# Patient Record
Sex: FEMALE | Race: OTHER | Employment: FULL TIME | URBAN - METROPOLITAN AREA
[De-identification: names, ages, dates, MRNs, and addresses within clinical notes are randomized per-mention and may not be internally consistent; named-entity substitution may affect disease eponyms.]

---

## 2017-04-25 ENCOUNTER — OFFICE VISIT (OUTPATIENT)
Dept: OBGYN CLINIC | Age: 42
End: 2017-04-25
Payer: COMMERCIAL

## 2017-04-25 VITALS
RESPIRATION RATE: 18 BRPM | WEIGHT: 139 LBS | HEART RATE: 72 BPM | DIASTOLIC BLOOD PRESSURE: 60 MMHG | HEIGHT: 63 IN | SYSTOLIC BLOOD PRESSURE: 114 MMHG | BODY MASS INDEX: 24.63 KG/M2

## 2017-04-25 DIAGNOSIS — N89.8 VAGINAL DISCHARGE: Primary | ICD-10-CM

## 2017-04-25 DIAGNOSIS — R10.2 VAGINAL PAIN: ICD-10-CM

## 2017-04-25 DIAGNOSIS — D25.0 SUBMUCOUS LEIOMYOMA OF UTERUS: ICD-10-CM

## 2017-04-25 PROCEDURE — 99213 OFFICE O/P EST LOW 20 MIN: CPT | Performed by: ADVANCED PRACTICE MIDWIFE

## 2017-04-25 ASSESSMENT — PATIENT HEALTH QUESTIONNAIRE - PHQ9
SUM OF ALL RESPONSES TO PHQ QUESTIONS 1-9: 0
SUM OF ALL RESPONSES TO PHQ9 QUESTIONS 1 & 2: 0
1. LITTLE INTEREST OR PLEASURE IN DOING THINGS: 0
2. FEELING DOWN, DEPRESSED OR HOPELESS: 0

## 2017-04-27 DIAGNOSIS — N89.8 VAGINAL DISCHARGE: ICD-10-CM

## 2017-04-27 DIAGNOSIS — R10.2 VAGINAL PAIN: ICD-10-CM

## 2017-04-27 RX ORDER — FLUCONAZOLE 150 MG/1
150 TABLET ORAL ONCE
Qty: 1 TABLET | Refills: 1 | Status: SHIPPED | OUTPATIENT
Start: 2017-04-27 | End: 2017-04-27

## 2017-04-27 RX ORDER — METRONIDAZOLE 500 MG/1
500 TABLET ORAL 2 TIMES DAILY
Qty: 14 TABLET | Refills: 0 | Status: SHIPPED | OUTPATIENT
Start: 2017-04-27 | End: 2017-05-04

## 2017-10-19 ENCOUNTER — TELEPHONE (OUTPATIENT)
Dept: OBGYN CLINIC | Age: 42
End: 2017-10-19

## 2017-10-19 ENCOUNTER — HOSPITAL ENCOUNTER (OUTPATIENT)
Dept: ULTRASOUND IMAGING | Age: 42
Discharge: HOME | End: 2017-10-19
Payer: COMMERCIAL

## 2017-10-19 ENCOUNTER — HOSPITAL ENCOUNTER (OUTPATIENT)
Age: 42
Setting detail: SPECIMEN
Discharge: HOME | End: 2017-10-19
Payer: COMMERCIAL

## 2017-10-19 DIAGNOSIS — D25.0 SUBMUCOUS LEIOMYOMA OF UTERUS: ICD-10-CM

## 2017-10-19 DIAGNOSIS — N92.6 MISSED MENSES: ICD-10-CM

## 2017-10-19 DIAGNOSIS — R10.2 VAGINAL PAIN: ICD-10-CM

## 2017-10-19 DIAGNOSIS — N89.8 VAGINAL DISCHARGE: ICD-10-CM

## 2017-10-19 DIAGNOSIS — O36.80X0 ENCOUNTER TO DETERMINE FETAL VIABILITY OF PREGNANCY, SINGLE OR UNSPECIFIED FETUS: Primary | ICD-10-CM

## 2017-10-19 PROBLEM — D21.9 FIBROID: Status: ACTIVE | Noted: 2017-10-19

## 2017-10-19 LAB — HCG QUANTITATIVE: ABNORMAL IU/L

## 2017-10-19 PROCEDURE — 36415 COLL VENOUS BLD VENIPUNCTURE: CPT

## 2017-10-19 PROCEDURE — 76830 TRANSVAGINAL US NON-OB: CPT

## 2017-10-19 PROCEDURE — 76856 US EXAM PELVIC COMPLETE: CPT

## 2017-10-19 PROCEDURE — 84702 CHORIONIC GONADOTROPIN TEST: CPT

## 2017-10-23 ENCOUNTER — TELEPHONE (OUTPATIENT)
Dept: OBGYN CLINIC | Age: 42
End: 2017-10-23

## 2017-10-23 DIAGNOSIS — Z34.90 EARLY STAGE OF PREGNANCY: Primary | ICD-10-CM

## 2017-10-23 NOTE — TELEPHONE ENCOUNTER
----- Message from Nicholas Contreras CNP sent at 10/23/2017  7:59 AM EDT -----  OB Ultrasound to determine fetal viability  Prenatal vitamin 1 PO QD with 12 refills  Will need new OB appointment once fetal viability is established.

## 2017-10-23 NOTE — TELEPHONE ENCOUNTER
----- Message from Rae Jeter CNP sent at 10/23/2017  7:59 AM EDT -----  OB Ultrasound to determine fetal viability  Prenatal vitamin 1 PO QD with 12 refills  Will need new OB appointment once fetal viability is established.

## 2017-10-23 NOTE — TELEPHONE ENCOUNTER
Per Mariusz, pt given positive quant results. Ob ultrasound ordered and prenatal vitamins sent to pts pharmacy.

## 2017-11-02 ENCOUNTER — TELEPHONE (OUTPATIENT)
Dept: OBGYN CLINIC | Age: 42
End: 2017-11-02

## 2017-11-02 ENCOUNTER — APPOINTMENT (OUTPATIENT)
Dept: ULTRASOUND IMAGING | Age: 42
End: 2017-11-02
Payer: COMMERCIAL

## 2017-11-02 ENCOUNTER — HOSPITAL ENCOUNTER (EMERGENCY)
Age: 42
Discharge: HOME | End: 2017-11-02
Attending: EMERGENCY MEDICINE
Payer: COMMERCIAL

## 2017-11-02 VITALS
HEIGHT: 63 IN | HEART RATE: 74 BPM | WEIGHT: 120 LBS | SYSTOLIC BLOOD PRESSURE: 123 MMHG | TEMPERATURE: 97.7 F | OXYGEN SATURATION: 97 % | RESPIRATION RATE: 16 BRPM | DIASTOLIC BLOOD PRESSURE: 72 MMHG | BODY MASS INDEX: 21.26 KG/M2

## 2017-11-02 DIAGNOSIS — O03.4 INEVITABLE ABORTION: Primary | ICD-10-CM

## 2017-11-02 LAB
ABO/RH: NORMAL
ABSOLUTE EOS #: 0.09 K/UL (ref 0–0.4)
ABSOLUTE IMMATURE GRANULOCYTE: ABNORMAL K/UL (ref 0–0.3)
ABSOLUTE LYMPH #: 2.37 K/UL (ref 1–4.8)
ABSOLUTE MONO #: 0.64 K/UL (ref 0.1–1.3)
BASOPHILS # BLD: 1 %
BASOPHILS ABSOLUTE: 0.09 K/UL (ref 0–0.2)
BLOOD BANK COMMENT: NORMAL
DIFFERENTIAL TYPE: ABNORMAL
EOSINOPHILS RELATIVE PERCENT: 1 %
HCG QUANTITATIVE: ABNORMAL IU/L
HCT VFR BLD CALC: 38.1 % (ref 36–46)
HEMOGLOBIN: 12.5 G/DL (ref 12–16)
IMMATURE GRANULOCYTES: ABNORMAL %
LYMPHOCYTES # BLD: 26 %
MCH RBC QN AUTO: 23.5 PG (ref 26–34)
MCHC RBC AUTO-ENTMCNC: 32.7 G/DL (ref 31–37)
MCV RBC AUTO: 71.8 FL (ref 80–100)
MONOCYTES # BLD: 7 %
MORPHOLOGY: ABNORMAL
PDW BLD-RTO: 14.8 % (ref 11.5–14.9)
PLATELET # BLD: 409 K/UL (ref 150–450)
PLATELET ESTIMATE: ABNORMAL
PMV BLD AUTO: 7.7 FL (ref 6–12)
RBC # BLD: 5.3 M/UL (ref 4–5.2)
RBC # BLD: ABNORMAL 10*6/UL
SEG NEUTROPHILS: 65 %
SEGMENTED NEUTROPHILS ABSOLUTE COUNT: 5.91 K/UL (ref 1.3–9.1)
WBC # BLD: 9.1 K/UL (ref 3.5–11)
WBC # BLD: ABNORMAL 10*3/UL

## 2017-11-02 PROCEDURE — 99284 EMERGENCY DEPT VISIT MOD MDM: CPT

## 2017-11-02 PROCEDURE — 86901 BLOOD TYPING SEROLOGIC RH(D): CPT

## 2017-11-02 PROCEDURE — 86900 BLOOD TYPING SEROLOGIC ABO: CPT

## 2017-11-02 PROCEDURE — 76817 TRANSVAGINAL US OBSTETRIC: CPT

## 2017-11-02 PROCEDURE — 36415 COLL VENOUS BLD VENIPUNCTURE: CPT

## 2017-11-02 PROCEDURE — 84702 CHORIONIC GONADOTROPIN TEST: CPT

## 2017-11-02 PROCEDURE — 85025 COMPLETE CBC W/AUTO DIFF WBC: CPT

## 2017-11-02 RX ORDER — COLCHICINE 0.6 MG/1
1.2 TABLET ORAL ONCE
Status: DISCONTINUED | OUTPATIENT
Start: 2017-11-02 | End: 2017-11-02

## 2017-11-02 ASSESSMENT — ENCOUNTER SYMPTOMS
SHORTNESS OF BREATH: 0
EYE PAIN: 0
ABDOMINAL PAIN: 0
RHINORRHEA: 0
COUGH: 0
NAUSEA: 0
EYE REDNESS: 0
VOMITING: 0
BACK PAIN: 0

## 2017-11-02 NOTE — ED PROVIDER NOTES
patient's allergies indicates no known allergies. FAMILY HISTORY       Family Status   Relation Status    Father     Mother       Family History   Problem Relation Age of Onset    Heart Attack Father     Diabetes Mother     Hypertension Mother        SOCIAL HISTORY       Social History     Social History    Marital status:      Spouse name: N/A    Number of children: N/A    Years of education: N/A     Social History Main Topics    Smoking status: Never Smoker    Smokeless tobacco: Never Used    Alcohol use 0.6 oz/week     1 Standard drinks or equivalent per week    Drug use: No    Sexual activity: Yes     Partners: Male     Other Topics Concern    None     Social History Narrative    None       PHYSICAL EXAM     INITIAL VITALS:  height is 5' 3\" (1.6 m) and weight is 120 lb (54.4 kg). Her oral temperature is 97.7 °F (36.5 °C). Her blood pressure is 123/72 and her pulse is 74. Her respiration is 16 and oxygen saturation is 97%. Physical Exam   Constitutional: She is oriented to person, place, and time. She appears well-developed and well-nourished. HENT:   Head: Normocephalic and atraumatic. Nose: Nose normal.   Mouth/Throat: Oropharynx is clear and moist.   Eyes: Conjunctivae and EOM are normal. Pupils are equal, round, and reactive to light. Neck: Normal range of motion. Neck supple. Cardiovascular: Normal rate, regular rhythm, normal heart sounds and intact distal pulses. Pulmonary/Chest: Effort normal and breath sounds normal.   Abdominal: Soft. Bowel sounds are normal.   Musculoskeletal: Normal range of motion. Neurological: She is alert and oriented to person, place, and time. Skin: Skin is warm and dry. Nursing note and vitals reviewed. DIFFERENTIAL DIAGNOSIS/ MDM:     Patient here with threatened miscarriage. Labs and ultrasound pending    1320: Patient with diminishing Quant and ultrasound does not show anything reassuring.   Patient updated at this time is stable for discharge    DIAGNOSTIC RESULTS       RADIOLOGY:   I directly visualized the following  images and reviewed the radiologist interpretations:  US OB TRANSVAGINAL    (Results Pending)          LABS:  Labs Reviewed   HCG, QUANTITATIVE, PREGNANCY - Abnormal; Notable for the following:        Result Value    hCG Quant 12,687 (*)     All other components within normal limits   CBC WITH AUTO DIFFERENTIAL - Abnormal; Notable for the following:     RBC 5.30 (*)     MCV 71.8 (*)     MCH 23.5 (*)     All other components within normal limits   ABO/RH           EMERGENCY DEPARTMENT COURSE:   Vitals:    Vitals:    17 1125   BP: 123/72   Pulse: 74   Resp: 16   Temp: 97.7 °F (36.5 °C)   TempSrc: Oral   SpO2: 97%   Weight: 120 lb (54.4 kg)   Height: 5' 3\" (1.6 m)     -------------------------  BP: 123/72, Temp: 97.7 °F (36.5 °C), Pulse: 74, Resp: 16      FINAL IMPRESSION      1.  Inevitable           DISPOSITION/PLAN    DISPOSITION Decision to Discharge    PATIENT REFERRED TO:  Olesya Swan, [address] [address] [facility] [address] [address]    Call in 1 day        DISCHARGE MEDICATIONS:  New Prescriptions    No medications on file       (Please note that portions of this note were completed with a voice recognition program.  Efforts were made to edit the dictations but occasionally words are mis-transcribed.)    Dean Hollingsworth MD, [facility]  Attending Emergency Physician                     Dean Hollingsworth MD  [phone]

## 2017-11-02 NOTE — FLOWSHEET NOTE
Patient shared she is two months pregnant and concerned about the baby. She said her 15 yo daughter is very excited about being a big sister. 11/02/17 1304   Encounter Summary   Services provided to: Patient   Referral/Consult From: [facility]   Support System Spouse; Children   Continue Visiting (11-2-17)   Complexity of Encounter Low   Length of Encounter 15 minutes   Spiritual Assessment Completed Yes   Routine   Type Initial   Assessment Approachable; Anxious   Intervention Active listening;Explored feelings, thoughts, concerns;Nurtured hope;Prayer;Sustaining presence/ Ministry of presence   Outcome Expressed gratitude;Engaged in conversation;Expressed feelings/needs/concerns

## 2017-11-02 NOTE — ED NOTES
Pt said that she's had some vaginal bleeding since last night. That she noticed it when she went to the bathroom and urinated. It was pink. This morning around 8 am she had some thicker blood when she urinated. Pt said that she had cramping then, but it is now gone.       Shakir Gauthier RN  11/02/17 9810

## 2021-06-15 ENCOUNTER — HOSPITAL ENCOUNTER (OUTPATIENT)
Age: 46
Setting detail: SPECIMEN
Discharge: HOME | End: 2021-06-15
Payer: COMMERCIAL

## 2021-06-15 ENCOUNTER — OFFICE VISIT (OUTPATIENT)
Dept: OBGYN CLINIC | Age: 46
End: 2021-06-15
Payer: COMMERCIAL

## 2021-06-15 VITALS
WEIGHT: 149 LBS | BODY MASS INDEX: 26.4 KG/M2 | HEIGHT: 63 IN | SYSTOLIC BLOOD PRESSURE: 114 MMHG | DIASTOLIC BLOOD PRESSURE: 70 MMHG

## 2021-06-15 DIAGNOSIS — Z01.419 WELL FEMALE EXAM WITH ROUTINE GYNECOLOGICAL EXAM: Primary | ICD-10-CM

## 2021-06-15 DIAGNOSIS — R30.0 DYSURIA: ICD-10-CM

## 2021-06-15 DIAGNOSIS — R10.9 ABDOMINAL CRAMPING: ICD-10-CM

## 2021-06-15 DIAGNOSIS — R10.2 PELVIC PAIN: ICD-10-CM

## 2021-06-15 DIAGNOSIS — Z12.31 ENCOUNTER FOR SCREENING MAMMOGRAM FOR MALIGNANT NEOPLASM OF BREAST: ICD-10-CM

## 2021-06-15 DIAGNOSIS — Z11.51 SPECIAL SCREENING EXAMINATION FOR HUMAN PAPILLOMAVIRUS (HPV): ICD-10-CM

## 2021-06-15 LAB
-: ABNORMAL
AMORPHOUS: ABNORMAL
BACTERIA: ABNORMAL
BILIRUBIN URINE: NEGATIVE
CASTS UA: ABNORMAL /LPF
COLOR: YELLOW
COMMENT UA: ABNORMAL
CRYSTALS, UA: ABNORMAL /HPF
DIRECT EXAM: ABNORMAL
EPITHELIAL CELLS UA: ABNORMAL /HPF
GLUCOSE URINE: NEGATIVE
KETONES, URINE: NEGATIVE
LEUKOCYTE ESTERASE, URINE: ABNORMAL
Lab: ABNORMAL
MUCUS: ABNORMAL
NITRITE, URINE: POSITIVE
OTHER OBSERVATIONS UA: ABNORMAL
PH UA: 6 (ref 5–8)
PROTEIN UA: ABNORMAL
RBC UA: ABNORMAL /HPF
RENAL EPITHELIAL, UA: ABNORMAL /HPF
SPECIFIC GRAVITY UA: 1.02 (ref 1–1.03)
SPECIMEN DESCRIPTION: ABNORMAL
TRICHOMONAS: ABNORMAL
TURBIDITY: ABNORMAL
URINE HGB: ABNORMAL
UROBILINOGEN, URINE: NORMAL
WBC UA: ABNORMAL /HPF
YEAST: ABNORMAL

## 2021-06-15 PROCEDURE — 87491 CHLMYD TRACH DNA AMP PROBE: CPT

## 2021-06-15 PROCEDURE — 87660 TRICHOMONAS VAGIN DIR PROBE: CPT

## 2021-06-15 PROCEDURE — 87510 GARDNER VAG DNA DIR PROBE: CPT

## 2021-06-15 PROCEDURE — 99386 PREV VISIT NEW AGE 40-64: CPT | Performed by: NURSE PRACTITIONER

## 2021-06-15 PROCEDURE — 81001 URINALYSIS AUTO W/SCOPE: CPT

## 2021-06-15 PROCEDURE — 87591 N.GONORRHOEAE DNA AMP PROB: CPT

## 2021-06-15 PROCEDURE — 87086 URINE CULTURE/COLONY COUNT: CPT

## 2021-06-15 PROCEDURE — G0145 SCR C/V CYTO,THINLAYER,RESCR: HCPCS

## 2021-06-15 PROCEDURE — 87480 CANDIDA DNA DIR PROBE: CPT

## 2021-06-15 PROCEDURE — 87088 URINE BACTERIA CULTURE: CPT

## 2021-06-15 PROCEDURE — 87186 SC STD MICRODIL/AGAR DIL: CPT

## 2021-06-15 PROCEDURE — 87624 HPV HI-RISK TYP POOLED RSLT: CPT

## 2021-06-15 RX ORDER — PHENAZOPYRIDINE HYDROCHLORIDE 200 MG/1
200 TABLET, FILM COATED ORAL 3 TIMES DAILY PRN
Qty: 9 TABLET | Refills: 0 | Status: SHIPPED | OUTPATIENT
Start: 2021-06-15 | End: 2021-06-18

## 2021-06-15 RX ORDER — CEPHALEXIN 500 MG/1
500 CAPSULE ORAL 3 TIMES DAILY
Qty: 21 CAPSULE | Refills: 0 | Status: SHIPPED | OUTPATIENT
Start: 2021-06-15 | End: 2021-06-22

## 2021-06-15 ASSESSMENT — PATIENT HEALTH QUESTIONNAIRE - PHQ9
SUM OF ALL RESPONSES TO PHQ QUESTIONS 1-9: 0
2. FEELING DOWN, DEPRESSED OR HOPELESS: 0
SUM OF ALL RESPONSES TO PHQ QUESTIONS 1-9: 0
SUM OF ALL RESPONSES TO PHQ9 QUESTIONS 1 & 2: 0
SUM OF ALL RESPONSES TO PHQ QUESTIONS 1-9: 0
1. LITTLE INTEREST OR PLEASURE IN DOING THINGS: 0

## 2021-06-15 NOTE — PROGRESS NOTES
History and Physical  [address], [address] [facility] [address] [phone]   Fax [phone]  Galilea Rivas  6/15/2021              39 y.o. Chief Complaint   Patient presents with    Annual Exam       Patient's last menstrual period was 2021. Primary Care Physician: VENANCIO Scott - CNP    The patient was seen and examined. She has no chief complaint today and is here for her annual exam.  Her bowels are regular. There are no voiding complaints. She denies any bloating. She denies vaginal discharge and was counseled on STD's and the need for barrier contraception. HPI : Galilea Rivas is a 39 y.o. female     Annual exam  Complaining of burning after urination. Has been occurring for the past week. Complaining of lower pelvic pain. Started 2 days ago. Was bad last night and had to take 2 tylenol. Denies fever. Denies new partner. Has  of 3 years. Still having monthly menses. LMP 2 weeks ago  ________________________________________________________________________  OB History    Para Term  AB Living   1 1 1 0 0 1   SAB TAB Ectopic Molar Multiple Live Births   0 0 0 0 0 1      # Outcome Date GA Lbr Gerard/2nd Weight Sex Delivery Anes PTL Lv   1 Term 03 38w0d 04:00 6 lb (2.722 kg) F CS-LTranv  N NOEMY      Birth Comments: Breech     History reviewed. No pertinent past medical history.                                                                 Past Surgical History:   Procedure Laterality Date     SECTION       Family History   Problem Relation Age of Onset    Heart Attack Father     Diabetes Mother     Hypertension Mother      Social History     Socioeconomic History    Marital status:      Spouse name: Not on file    Number of children: Not on file    Years of education: Not on file    Highest education level: Not on file   Occupational History    Not on file   Tobacco Use available. Gynecologic History:  Menarche: 12 yo  Menopause at [address] [place] yo     Patient's last menstrual period was 06/01/2021. Sexually Active: Yes    STD History: No     Permanent Sterilization: No   Reversible Birth Control: No        Hormone Replacement Exposure: No      Genetic Qualified Family History of Breast, Ovarian , Colon or Uterine Cancer: No     If YES see scanned worksheet. Preventative Health Testing:    Health Maintenance:  Health Maintenance Due   Topic Date Due    Hepatitis C screen  Never done    COVID-19 Vaccine (1) Never done    HIV screen  Never done    DTaP/Tdap/Td vaccine (1 - Tdap) Never done    Lipid screen  Never done    Diabetes screen  Never done    Cervical cancer screen  10/10/2017       Date of Last Pap Smear: 10/10/2016 neg/neg  Abnormal Pap Smear History: denies  Colposcopy History:   Date of Last Mammogram:  Never had one  Date of Last Colonoscopy:   Date of Last Bone Density:      ________________________________________________________________________        REVIEW OF SYSTEMS:    yes   A minimum of an eleven point review of systems was completed. Review Of Systems (11 point):  Constitutional: No fever, chills or malaise; No weight change or fatigue  Head and Eyes: No vision, Headache, Dizziness or trauma in last 12 months  ENT ROS: No hearing, Tinnitis, sinus or taste problems  Hematological and Lymphatic ROS:No Lymphoma, Von Willebrand's, Hemophillia or Bleeding History  Psych ROS: No Depression, Homicidal thoughts,suicidal thoughts, or anxiety  Breast ROS: No prior breast abnormalities or lumps  Respiratory ROS: No SOB, Pneumoniae,Cough, or Pulmonary Embolism History  Cardiovascular ROS: No Chest Pain with Exertion, Palpitations, Syncope, Edema, Arrhythmia  Gastrointestinal ROS: No Indigestion, Heartburn, Nausea, vomiting, Diarrhea, Constipation,or Bowel Changes; No Bloody Stools or melena  Genito-Urinary ROS: No Dysuria, Hematuria or Nocturia.  No Urinary Incontinence or Vaginal Discharge. + pelvic pain, burning after urination  Musculoskeletal ROS: No Arthralgia, Arthritis,Gout,Osteoporosis or Rheumatism  Neurological ROS: No CVA, Migraines, Epilepsy, Seizure Hx, or Limb Weakness  Dermatological ROS: No Rash, Itching, Hives, Mole Changes or Cancer                                                                                                                                                                                                                                  PHYSICAL Exam:     Constitutional:  Vitals:    06/15/21 1444   BP: 114/70   Site: Right Upper Arm   Position: Sitting   Cuff Size: Medium Adult   Weight: 149 lb (67.6 kg)   Height: 5' 3\" (1.6 m)       Chaperone for Intimate Exam   Chaperone was offered and accepted as part of the rooming process.  Chaperone: Ias          General Appearance: This  is a well Developed, well Nourished, well groomed female. Her BMI was reviewed. Nutritional decision making was discussed. Skin:  There was a Normal Inspection of the skin without rashes or lesions. There were no rashes. (Papular, Maculopapular, Hives, Pustular, Macular)     There were no lesions (Ulcers, Erythema, Abn. Appearing Nevi)            Lymphatic:  No Lymph Nodes were Palpable in the neck , axilla or groin.  0 # Of Lymph Nodes; Location ; Character [Normal]  [Shotty] [Tender] [Enlarged]     Neck and EENT:  The neck was supple. There were no masses   The thyroid was not enlarged and had no masses. Perrla, EOMI B/L, TMI B/L No Abnormalities. Throat inspected-No exudates or Masses, Nares Patent No Masses        Respiratory: The lungs were auscultated and found to be clear. There were no rales, rhonchi or wheezes. There was a good respiratory effort. Cardiovascular: The heart was in a regular rate and rhythm. . No S3 or S4. There was no murmur appreciated. Location, grade, and radiation are not applicable. Extremities:   The patients extremities were without calf tenderness, edema, or varicosities. There was full range of motion in all four extremities. Pulses in all four extremities were appreciated and are 2/4. Abdomen: The abdomen was soft and non-tender. There were good bowel sounds in all quadrants and there was no guarding, rebound or rigidity. On evaluation there was no evidence of hepatosplenomegaly and there was no costal vertebral sherley tenderness bilaterally. No hernias were appreciated. Abdominal Scars: intact    Psych: The patient had a normal Orientation to: Time, Place, Person, and Situation  There is no Mood / Affect changes    Breast:  (Chest)  normal appearance, no masses or tenderness  Self breast exams were reviewed in detail. Literature was given. Pelvic Exam:  Vulva and vagina appear normal. Bimanual exam reveals normal uterus and adnexa. Rectal Exam:  exam declined by patient          Musculosk:  Normal Gait and station was noted. Digits were evaluated without abnormal findings. Range of motion, stability and strength were evaluated and found to be appropriate for the patients age. ASSESSMENT:      39 y.o. Annual   Diagnosis Orders   1. Well female exam with routine gynecological exam  PAP SMEAR   2. Encounter for screening mammogram for malignant neoplasm of breast  GALLO DIGITAL SCREEN W OR WO CAD BILATERAL   3. Dysuria  Urinalysis Reflex to Culture    phenazopyridine (PYRIDIUM) 200 MG tablet    cephALEXin (KEFLEX) 500 MG capsule   4. Abdominal cramping  VAGINITIS DNA PROBE    C.trachomatis N.gonorrhoeae DNA   5. Special screening examination for human papillomavirus (HPV)  PAP SMEAR   6. Pelvic pain  US NON OB TRANSVAGINAL    US PELVIS COMPLETE          Chief Complaint   Patient presents with    Annual Exam          History reviewed. No pertinent past medical history.       Patient Active Problem List    Diagnosis Date Noted    Fibroid 10/19/2017          Hereditary Breast, Ovarian, Colon and Uterine Cancer screening Done. Tobacco & Secondary smoke risks reviewed; instructed on cessation and avoidance      Counseling Completed:  Preventative Health Recommendations and Follow up. The patient was informed of the recommended preventative health recommendations. 1. Annuals every year; Cytology collections per prevailing guidelines. 2. Mammograms begin every year at 35 yo if no abnormalities are found and no family history. 3. Bone density studies every 2-3 years. Begin at 73 yo. If no fracture history or osteoporosis family history. (significant). 4. Colonoscopy begin at 38 yo. Repeat every ten years if negative and no family history. 5. Calcium of 5203-5252 mg/day in split dosing  6. Vitamin D 400-800 IU/day  7. All other preventative health recommendations will be managed by the patients Primary care physician. PLAN:  Return in about 1 year (around 6/15/2022) for annual.   Pap smear and Vaginal cultures collected. UA C*S obtained. Mammogram ordered. Pelvic ultrasound ordered- if pelvic pain continues. Prescription for Keflex and pyridium sent pending C*S results- per patient request.  Repeat Annual every 1 year  Cervical Cytology Evaluation begins at 24years old. If Negative Cytology, Follow-up screening per current guidelines. Mammograms every 1 year. If 35 yo and last mammogram was negative. Calcium and Vitamin D dosing reviewed. Colonoscopy screening reviewed as well as onset for bone density testing. Birth control and barrier recommendations discussed. STD counseling and prevention reviewed. Gardisil counseling completed for all patients 10-35 yo. Routine health maintenance per patients PCP.   Orders Placed This Encounter   Procedures    VAGINITIS DNA PROBE     Standing Status:   Future     Standing Expiration Date:   6/15/2022    C.trachomatis N.gonorrhoeae DNA     Standing Status:   Future     Standing Expiration Date:   6/15/2022   Ned HOLDER DIGITAL SCREEN W OR WO CAD BILATERAL     Standing Status:   Future     Standing Expiration Date:   8/15/2022     Order Specific Question:   Reason for exam:     Answer:   screening mammogram    US NON OB TRANSVAGINAL     Standing Status:   Future     Standing Expiration Date:   12/15/2021     Order Specific Question:   Reason for exam:     Answer:   pelvic pain    US PELVIS COMPLETE     Standing Status:   Future     Standing Expiration Date:   9/15/2021     Order Specific Question:   Reason for exam:     Answer:   pelvic pain    PAP SMEAR     Patient History:    Patient's last menstrual period was 2021. OBGYN Status: Having periods  Past Surgical History:  :  SECTION      Social History    Tobacco Use      Smoking status: Never Smoker      Smokeless tobacco: Never Used       Standing Status:   Future     Standing Expiration Date:   6/15/2022     Order Specific Question:   Collection Type     Answer: Thin Prep     Order Specific Question:   Prior Abnormal Pap Test     Answer:   No     Order Specific Question:   Screening or Diagnostic     Answer:   Screening     Order Specific Question:   HPV Requested? Answer:   Yes     Order Specific Question:   High Risk Patient     Answer:   N/A    Urinalysis Reflex to Culture     Standing Status:   Future     Standing Expiration Date:   6/15/2022     Order Specific Question:   SPECIFY(EX-CATH,MIDSTREAM,CYSTO,ETC)? Answer:   clean catch           The patient, Yessy Hampton is a 39 y.o. female, was seen with a total time spent of 20 minutes for the visit on this date of service by the E/M provider. The time component had both face to face and non face to face time spent in determining the total time component. Counseling and education regarding her diagnosis listed below and her options regarding those diagnoses were also included in determining her time component. Diagnosis Orders   1.  Well female exam with routine gynecological exam  PAP SMEAR   2. Encounter for screening mammogram for malignant neoplasm of breast  GALLO DIGITAL SCREEN W OR WO CAD BILATERAL   3. Dysuria  Urinalysis Reflex to Culture    phenazopyridine (PYRIDIUM) 200 MG tablet    cephALEXin (KEFLEX) 500 MG capsule   4. Abdominal cramping  VAGINITIS DNA PROBE    C.trachomatis N.gonorrhoeae DNA   5. Special screening examination for human papillomavirus (HPV)  PAP SMEAR   6. Pelvic pain  US NON OB TRANSVAGINAL    US PELVIS COMPLETE        The patient had her preventative health maintenance recommendations and follow-up reviewed with her at the completion of her visit.

## 2021-06-16 ENCOUNTER — TELEPHONE (OUTPATIENT)
Dept: OBGYN CLINIC | Age: 46
End: 2021-06-16

## 2021-06-16 NOTE — TELEPHONE ENCOUNTER
Tried calling patient regarding UA and +BV. Pt will need flagyl sent to pt pharm. Ammon Jones, APRN - CNP  [facility] Ob[facility] Clinical Support Pool  +UTI- C&S results pending   Patient was sent script for keflex and pyridium yesterday   Please make sure patient knows results and to fill prescriptions-may have to change antibiotic once C&S results are back.      +BV- flagyl 500mg PO BID x 7 days

## 2021-06-17 LAB
C TRACH DNA GENITAL QL NAA+PROBE: NEGATIVE
CULTURE: ABNORMAL
HPV SAMPLE: NORMAL
HPV, GENOTYPE 16: NOT DETECTED
HPV, GENOTYPE 18: NOT DETECTED
HPV, HIGH RISK OTHER: NOT DETECTED
HPV, INTERPRETATION: NORMAL
Lab: ABNORMAL
N. GONORRHOEAE DNA: NEGATIVE
SPECIMEN DESCRIPTION: ABNORMAL
SPECIMEN DESCRIPTION: NORMAL
SPECIMEN DESCRIPTION: NORMAL

## 2021-06-17 NOTE — RESULT ENCOUNTER NOTE
Per Leticia Burn patient is okay to continue on Keflex which was prescribed prior to having urine culture back.

## 2021-06-21 LAB — CYTOLOGY REPORT: NORMAL

## 2021-06-23 ENCOUNTER — TELEPHONE (OUTPATIENT)
Dept: OBGYN CLINIC | Age: 46
End: 2021-06-23

## 2021-06-23 RX ORDER — METRONIDAZOLE 500 MG/1
500 TABLET ORAL 2 TIMES DAILY
Qty: 14 TABLET | Refills: 0 | Status: SHIPPED | OUTPATIENT
Start: 2021-06-23 | End: 2021-06-30

## 2021-06-23 NOTE — TELEPHONE ENCOUNTER
----- Message from VENANCIO Banks - CNP sent at 6/16/2021  7:49 AM EDT -----  +UTI- C&S results pending  Patient was sent script for keflex and pyridium yesterday  Please make sure patient knows results and to fill prescriptions-may have to change antibiotic once C&S results are back.     +BV- flagyl 500mg PO BID x 7 days

## 2021-07-21 ENCOUNTER — OFFICE VISIT (OUTPATIENT)
Dept: FAMILY MEDICINE CLINIC | Age: 46
End: 2021-07-21
Payer: COMMERCIAL

## 2021-07-21 VITALS
DIASTOLIC BLOOD PRESSURE: 60 MMHG | TEMPERATURE: 97.5 F | SYSTOLIC BLOOD PRESSURE: 98 MMHG | HEART RATE: 64 BPM | BODY MASS INDEX: 26.58 KG/M2 | HEIGHT: 63 IN | WEIGHT: 150 LBS

## 2021-07-21 DIAGNOSIS — Z13.1 ENCOUNTER FOR SCREENING FOR DIABETES MELLITUS: ICD-10-CM

## 2021-07-21 DIAGNOSIS — Z00.00 ANNUAL PHYSICAL EXAM: Primary | ICD-10-CM

## 2021-07-21 PROCEDURE — 99386 PREV VISIT NEW AGE 40-64: CPT | Performed by: NURSE PRACTITIONER

## 2021-07-21 SDOH — ECONOMIC STABILITY: FOOD INSECURITY: WITHIN THE PAST 12 MONTHS, THE FOOD YOU BOUGHT JUST DIDN'T LAST AND YOU DIDN'T HAVE MONEY TO GET MORE.: NEVER TRUE

## 2021-07-21 SDOH — ECONOMIC STABILITY: FOOD INSECURITY: WITHIN THE PAST 12 MONTHS, YOU WORRIED THAT YOUR FOOD WOULD RUN OUT BEFORE YOU GOT MONEY TO BUY MORE.: NEVER TRUE

## 2021-07-21 ASSESSMENT — ENCOUNTER SYMPTOMS
ABDOMINAL PAIN: 0
SHORTNESS OF BREATH: 0
BLOOD IN STOOL: 0

## 2021-07-21 ASSESSMENT — SOCIAL DETERMINANTS OF HEALTH (SDOH): HOW HARD IS IT FOR YOU TO PAY FOR THE VERY BASICS LIKE FOOD, HOUSING, MEDICAL CARE, AND HEATING?: NOT HARD AT ALL

## 2021-07-21 NOTE — PROGRESS NOTES
Subjective:      Patient ID: Lauren Zamarripa is a 39 y.o. female. Visit Information    Have you changed or started any medications since your last visit including any over-the-counter medicines, vitamins, or herbal medicines? no   Are you having any side effects from any of your medications? -  no  Have you stopped taking any of your medications? Is so, why? -  no    Have you seen any other physician or provider since your last visit? No  Have you had any other diagnostic tests since your last visit? No  Have you been seen in the emergency room and/or had an admission to a hospital since we last saw you? No  Have you had your routine dental cleaning in the past 6 months? no    Have you activated your [employer] account? If not, what are your barriers? No: will discuss     Patient Care Team:  VENANCIO Fuller CNP as PCP - General (Certified Nurse Practitioner)  VENANCIO Fuller CNP as PCP - [facility]    Medical History Review  Past Medical, Family, and Social History reviewed and does not contribute to the patient presenting condition    Health Maintenance   Topic Date Due    Hepatitis C screen  Never done    HIV screen  Never done    DTaP/Tdap/Td vaccine (1 - Tdap) Never done    Lipid screen  Never done    Diabetes screen  Never done    Colon cancer screen colonoscopy  Never done    Flu vaccine (1) 09/01/2021    Cervical cancer screen  06/15/2022    COVID-19 Vaccine  Completed    Hepatitis A vaccine  Aged Out    Hepatitis B vaccine  Aged Out    Hib vaccine  Aged Out    Meningococcal (ACWY) vaccine  Aged Out    Pneumococcal 0-64 years Vaccine  Aged Out       HPI hasn't been in for 5 years. 39year old female presents with annual physical. Currently is not on any medication. States she has increased water intake recently and would like to have dm checked. States her mom is diabetic. Also c/o itchy rash in bilateral hands on and off for a while.  Was using topical steroid cream with relief and would like refills. Denies smoking alcohol or illicit drug use. Review of Systems   Constitutional: Negative for chills and fever. Eyes: Negative for visual disturbance. Respiratory: Negative for shortness of breath. Cardiovascular: Negative for chest pain. Gastrointestinal: Negative for abdominal pain and blood in stool. Endocrine: Negative for polydipsia and polyuria. Genitourinary: Negative for dysuria and hematuria. Musculoskeletal: Negative for arthralgias and myalgias. Skin: Positive for rash. Neurological: Negative for dizziness, weakness and numbness. Psychiatric/Behavioral: Negative for agitation and behavioral problems. Objective:   Physical Exam  Vitals and nursing note reviewed. Constitutional:       General: She is not in acute distress. HENT:      Right Ear: External ear normal.      Left Ear: External ear normal.      Nose: Nose normal.   Eyes:      Conjunctiva/sclera: Conjunctivae normal.   Cardiovascular:      Rate and Rhythm: Normal rate and regular rhythm. Heart sounds: Normal heart sounds. Pulmonary:      Effort: Pulmonary effort is normal. No respiratory distress. Breath sounds: Normal breath sounds. Abdominal:      Palpations: Abdomen is soft. Abdomen is not rigid. Tenderness: There is no abdominal tenderness. Musculoskeletal:         General: No tenderness. Normal range of motion. Cervical back: Neck supple. Lymphadenopathy:      Cervical: No cervical adenopathy. Skin:     Findings: No erythema or rash. Neurological:      Mental Status: She is alert and oriented to person, place, and time. Cranial Nerves: No cranial nerve deficit. Psychiatric:         Mood and Affect: Mood normal.         Behavior: Behavior normal.         Assessment:      1. Annual physical exam    2.  Encounter for screening for diabetes mellitus            Plan:      BP Readings from Last 3 Encounters:   07/21/21 98/60   06/15/21 114/70 11/02/17 123/72     BP 98/60 (Site: Right Upper Arm, Position: Sitting, Cuff Size: Small Adult)   Pulse 64   Temp 97.5 °F (36.4 °C) (Temporal)   Ht 5' 3\" (1.6 m)   Wt 150 lb (68 kg)   BMI 26.57 kg/m²   Lab Results   Component Value Date    WBC 9.1 11/02/2017    HGB 12.5 11/02/2017    HCT 38.1 11/02/2017     11/02/2017     No results found for: CALCIUM, PHOS  No results found for: LDLCALC, LDLCHOLESTEROL, LDLDIRECT      1. Annual physical exam  - Glucose, Fasting; Future  - Lipid Panel; Future  - Diet, exercise and weight reduction discussed. 2. Encounter for screening for diabetes mellitus  - Hemoglobin A1C - NOT COVERED /DO NOT USE FOR MEDICARE PATIENTS; Future    Requested Prescriptions     Signed Prescriptions Disp Refills    hydrocortisone 2.5 % cream 30 g 1     Sig: Apply topically 2 times daily. There are no discontinued medications. Discussed use, benefit, and side effects of prescribed medications. Barriers to medication compliance addressed. All patient questions answered. Pt voiced understanding. Return in about 1 year (around 7/21/2022), or if symptoms worsen or fail to improve.

## 2022-01-18 ENCOUNTER — HOSPITAL ENCOUNTER (OUTPATIENT)
Age: 47
Discharge: HOME | End: 2022-01-18
Payer: COMMERCIAL

## 2022-01-18 DIAGNOSIS — Z00.00 ANNUAL PHYSICAL EXAM: ICD-10-CM

## 2022-01-18 DIAGNOSIS — Z13.1 ENCOUNTER FOR SCREENING FOR DIABETES MELLITUS: ICD-10-CM

## 2022-01-18 LAB
CHOLESTEROL/HDL RATIO: 5.2
CHOLESTEROL: 181 MG/DL
ESTIMATED AVERAGE GLUCOSE: 117 MG/DL
GLUCOSE FASTING: 96 MG/DL (ref 70–99)
HBA1C MFR BLD: 5.7 % (ref 4–6)
HDLC SERPL-MCNC: 35 MG/DL
LDL CHOLESTEROL: 89 MG/DL (ref 0–130)
TRIGL SERPL-MCNC: 283 MG/DL
VLDLC SERPL CALC-MCNC: ABNORMAL MG/DL (ref 1–30)

## 2022-01-18 PROCEDURE — 36415 COLL VENOUS BLD VENIPUNCTURE: CPT

## 2022-01-18 PROCEDURE — 80061 LIPID PANEL: CPT

## 2022-01-18 PROCEDURE — 82947 ASSAY GLUCOSE BLOOD QUANT: CPT

## 2022-01-18 PROCEDURE — 83036 HEMOGLOBIN GLYCOSYLATED A1C: CPT

## 2022-01-20 ENCOUNTER — OFFICE VISIT (OUTPATIENT)
Dept: FAMILY MEDICINE CLINIC | Age: 47
End: 2022-01-20
Payer: COMMERCIAL

## 2022-01-20 VITALS
DIASTOLIC BLOOD PRESSURE: 60 MMHG | TEMPERATURE: 97 F | HEIGHT: 63 IN | SYSTOLIC BLOOD PRESSURE: 106 MMHG | HEART RATE: 66 BPM | WEIGHT: 152.8 LBS | OXYGEN SATURATION: 98 % | BODY MASS INDEX: 27.07 KG/M2

## 2022-01-20 DIAGNOSIS — Z12.11 SCREEN FOR COLON CANCER: ICD-10-CM

## 2022-01-20 DIAGNOSIS — E78.2 MIXED HYPERLIPIDEMIA: Primary | ICD-10-CM

## 2022-01-20 DIAGNOSIS — M75.22 BICEPS TENDINITIS OF LEFT UPPER EXTREMITY: ICD-10-CM

## 2022-01-20 PROCEDURE — 99214 OFFICE O/P EST MOD 30 MIN: CPT | Performed by: NURSE PRACTITIONER

## 2022-01-20 ASSESSMENT — ENCOUNTER SYMPTOMS
SHORTNESS OF BREATH: 0
ABDOMINAL PAIN: 0
CHEST TIGHTNESS: 0
BLOOD IN STOOL: 0

## 2022-01-20 NOTE — PROGRESS NOTES
Subjective:      Patient ID: Anjali Forrester is a 55 y.o. female. Visit Information    Have you changed or started any medications since your last visit including any over-the-counter medicines, vitamins, or herbal medicines? no   Are you having any side effects from any of your medications? -  no  Have you stopped taking any of your medications? Is so, why? -  no    Have you seen any other physician or provider since your last visit? No  Have you had any other diagnostic tests since your last visit? No  Have you been seen in the emergency room and/or had an admission to a hospital since we last saw you? No  Have you had your routine dental cleaning in the past 6 months? no    Have you activated your [employer] account? If not, what are your barriers? Yes     Patient Care Team:  VENANCIO Campos CNP as PCP - General (Certified Nurse Practitioner)  VENANCIO Campos CNP as PCP - [facility]    Medical History Review  Past Medical, Family, and Social History reviewed and does not contribute to the patient presenting condition    Health Maintenance   Topic Date Due    Hepatitis C screen  Never done    HIV screen  Never done    DTaP/Tdap/Td vaccine (1 - Tdap) Never done    Colon cancer screen colonoscopy  Never done    Flu vaccine (1) Never done    COVID-19 Vaccine (3 - Booster for Scherer Peter series) 12/23/2021    Depression Screen  06/15/2022    A1C test (Diabetic or Prediabetic)  01/18/2023    Cervical cancer screen  06/15/2026    Lipid screen  01/18/2027    Hepatitis A vaccine  Aged Out    Hepatitis B vaccine  Aged Out    Hib vaccine  Aged Out    Meningococcal (ACWY) vaccine  Aged Out    Pneumococcal 0-64 years Vaccine  Aged Out     HPI    55year old female presents with management of HLD and left arm pain with medication refills. Noted to have elevated cholesterol and currently is not on statin therapy. Pt states she watches diet and does moderate exercise daily. bp is stable.  fhx of DM  Also has pain in left arm for a couple of weeks,  located in left elbow area. Pain gets worse with range of motion. States she has hard time lifting heavy objects but pain is better today. Denies direct injury but admits she does pedicure involving a lot of repetitive movements. Review of Systems   Constitutional: Negative for chills and fever. Respiratory: Negative for chest tightness and shortness of breath. Cardiovascular: Negative for chest pain. Gastrointestinal: Negative for abdominal pain and blood in stool. Musculoskeletal:        Left arm pain      Neurological: Negative for dizziness, weakness and numbness. Psychiatric/Behavioral: Negative for agitation and behavioral problems. Objective:   Physical Exam  Vitals and nursing note reviewed. Constitutional:       General: She is not in acute distress. Appearance: Normal appearance. HENT:      Nose: Nose normal.   Eyes:      Conjunctiva/sclera: Conjunctivae normal.   Cardiovascular:      Rate and Rhythm: Normal rate and regular rhythm. Heart sounds: Normal heart sounds. Pulmonary:      Effort: Pulmonary effort is normal. No respiratory distress. Breath sounds: Normal breath sounds. Abdominal:      Palpations: Abdomen is soft. Tenderness: There is no abdominal tenderness. Musculoskeletal:      Left shoulder: Tenderness ( biceps insertion. good ROM with strong hand ) present. Normal strength. Cervical back: Neck supple. Lymphadenopathy:      Cervical: No cervical adenopathy. Skin:     General: Skin is warm and dry. Neurological:      Mental Status: She is alert and oriented to person, place, and time. Cranial Nerves: No cranial nerve deficit. Psychiatric:         Mood and Affect: Mood normal.         Behavior: Behavior normal.         Assessment:      1. Mixed hyperlipidemia    2. Biceps tendinitis of left upper extremity    3.  Screen for colon cancer            Plan:      BP Readings from Last 3

## 2022-01-26 ENCOUNTER — TELEPHONE (OUTPATIENT)
Dept: OBGYN CLINIC | Age: 47
End: 2022-01-26

## 2022-01-26 ENCOUNTER — HOSPITAL ENCOUNTER (OUTPATIENT)
Dept: WOMENS IMAGING | Age: 47
Discharge: HOME | End: 2022-01-28
Payer: COMMERCIAL

## 2022-01-26 DIAGNOSIS — Z12.31 ENCOUNTER FOR SCREENING MAMMOGRAM FOR MALIGNANT NEOPLASM OF BREAST: ICD-10-CM

## 2022-01-26 DIAGNOSIS — N64.89 BREAST ASYMMETRY: Primary | ICD-10-CM

## 2022-01-26 PROCEDURE — 77063 BREAST TOMOSYNTHESIS BI: CPT

## 2022-01-26 NOTE — TELEPHONE ENCOUNTER
----- Message from VENANCIO Pace NP sent at 1/26/2022  2:18 PM EST -----  Bilateral asymmetry seen  Please order diagnostic mammogram and u/s bilaterally   Inform patient her mammogram noted bilateral dense tissue and this may limit the sensitivity

## 2022-02-09 ENCOUNTER — TELEPHONE (OUTPATIENT)
Dept: FAMILY MEDICINE CLINIC | Age: 47
End: 2022-02-09

## 2022-02-09 ENCOUNTER — E-VISIT (OUTPATIENT)
Dept: FAMILY MEDICINE CLINIC | Age: 47
End: 2022-02-09
Payer: COMMERCIAL

## 2022-02-09 DIAGNOSIS — R05.9 COUGH: ICD-10-CM

## 2022-02-09 DIAGNOSIS — R68.89 FLU-LIKE SYMPTOMS: Primary | ICD-10-CM

## 2022-02-09 PROCEDURE — 99422 OL DIG E/M SVC 11-20 MIN: CPT | Performed by: NURSE PRACTITIONER

## 2022-02-09 RX ORDER — AMOXICILLIN 875 MG/1
875 TABLET, COATED ORAL 2 TIMES DAILY
Qty: 14 TABLET | Refills: 0 | Status: SHIPPED | OUTPATIENT
Start: 2022-02-09 | End: 2022-02-16

## 2022-02-09 RX ORDER — GUAIFENESIN AND DEXTROMETHORPHAN HYDROBROMIDE 600; 30 MG/1; MG/1
1 TABLET, EXTENDED RELEASE ORAL 2 TIMES DAILY
Qty: 28 TABLET | Refills: 0 | Status: SHIPPED | OUTPATIENT
Start: 2022-02-09 | End: 2022-02-22

## 2022-02-09 ASSESSMENT — LIFESTYLE VARIABLES: SMOKING_STATUS: NO, I'VE NEVER SMOKED

## 2022-02-09 NOTE — TELEPHONE ENCOUNTER
----- Message from VENANCIO Sosa CNP sent at 2/9/2022 10:57 AM EST -----  She had E visit today and needs covid testing. Order placed. Will treat accordingly pending covid testing. Pls let pt know . The encounter disappeared.

## 2022-02-09 NOTE — PROGRESS NOTES
55year old female c/o nasal congestion, rhinorrhea sneezing and productive cough for 1 week. Took OTC cold medications without significant relief. She denies fever chills sob or nv abd pain. Her symptoms are more like URI. covid testing placed. Enc supportive therapy: zinc, vit c, vit d   - increase fluids and rest, increase protein intake   - tylenol as needed for discomfort  - amoxicillin and mucinex dm sent per pt's request  - call or go to ER if symptoms are worsening.     Time spent: 15 minutes

## 2022-02-11 ENCOUNTER — TELEPHONE (OUTPATIENT)
Dept: FAMILY MEDICINE CLINIC | Age: 47
End: 2022-02-11

## 2022-02-11 ENCOUNTER — HOSPITAL ENCOUNTER (OUTPATIENT)
Age: 47
Setting detail: SPECIMEN
Discharge: HOME | End: 2022-02-11

## 2022-02-11 ENCOUNTER — HOSPITAL ENCOUNTER (OUTPATIENT)
Age: 47
Discharge: HOME | End: 2022-02-13
Payer: COMMERCIAL

## 2022-02-11 ENCOUNTER — HOSPITAL ENCOUNTER (OUTPATIENT)
Dept: GENERAL RADIOLOGY | Age: 47
Discharge: HOME | End: 2022-02-13
Payer: COMMERCIAL

## 2022-02-11 DIAGNOSIS — R05.3 PERSISTENT COUGH: ICD-10-CM

## 2022-02-11 DIAGNOSIS — R05.3 PERSISTENT COUGH: Primary | ICD-10-CM

## 2022-02-11 DIAGNOSIS — U07.1 COVID-19: ICD-10-CM

## 2022-02-11 PROCEDURE — 71046 X-RAY EXAM CHEST 2 VIEWS: CPT

## 2022-02-11 NOTE — TELEPHONE ENCOUNTER
----- Message from [facility] [place] sent at 2/11/2022  7:52 AM EST -----  Subject: Message to Provider    QUESTIONS  Information for Provider? Pt's  called back and he was unaware of   any one leaving a vm. Ike Carlson is not getting any better and now has   a sore throat and her cough is getting worst.  states she doesn't   need a covid test because she has rapid test at home and they come out   negative, please advise.  ---------------------------------------------------------------------------  --------------  CALL BACK INFO  What is the best way for the office to contact you? OK to leave message on   voicemail  Preferred Call Back Phone Number? [phone]  ---------------------------------------------------------------------------  --------------  SCRIPT ANSWERS  Relationship to Patient? Other  Representative Name?   Is the Representative on the appropriate HIPAA document in Epic?  Yes

## 2022-02-11 NOTE — TELEPHONE ENCOUNTER
Informed the patient that a chest xray has been ordered and she is to complete the Covid test that had been ordered.

## 2022-02-11 NOTE — TELEPHONE ENCOUNTER
The patient did call again today to say that she has been taking the Amoxil for the past 3 days but is still coughing a lot, her chest hurts and she is asking if she should get chest xray ordered. Please advise.

## 2022-02-13 DIAGNOSIS — R68.89 FLU-LIKE SYMPTOMS: ICD-10-CM

## 2022-02-14 LAB
SARS-COV-2: NORMAL
SARS-COV-2: NOT DETECTED
SOURCE: NORMAL

## 2022-02-16 ENCOUNTER — HOSPITAL ENCOUNTER (OUTPATIENT)
Dept: WOMENS IMAGING | Age: 47
Discharge: HOME | End: 2022-02-18
Payer: COMMERCIAL

## 2022-02-16 DIAGNOSIS — N64.89 BREAST ASYMMETRY: ICD-10-CM

## 2022-02-16 PROCEDURE — 77066 DX MAMMO INCL CAD BI: CPT

## 2022-02-22 ENCOUNTER — OFFICE VISIT (OUTPATIENT)
Dept: FAMILY MEDICINE CLINIC | Age: 47
End: 2022-02-22
Payer: COMMERCIAL

## 2022-02-22 VITALS
TEMPERATURE: 97.3 F | HEART RATE: 82 BPM | DIASTOLIC BLOOD PRESSURE: 74 MMHG | SYSTOLIC BLOOD PRESSURE: 110 MMHG | WEIGHT: 150.8 LBS | OXYGEN SATURATION: 97 % | HEIGHT: 63 IN | BODY MASS INDEX: 26.72 KG/M2

## 2022-02-22 DIAGNOSIS — R06.02 SOB (SHORTNESS OF BREATH): ICD-10-CM

## 2022-02-22 DIAGNOSIS — R05.9 COUGH: ICD-10-CM

## 2022-02-22 DIAGNOSIS — J20.9 ACUTE BRONCHITIS, UNSPECIFIED ORGANISM: Primary | ICD-10-CM

## 2022-02-22 PROCEDURE — 99214 OFFICE O/P EST MOD 30 MIN: CPT | Performed by: NURSE PRACTITIONER

## 2022-02-22 RX ORDER — DOXYCYCLINE HYCLATE 100 MG
100 TABLET ORAL 2 TIMES DAILY
Qty: 14 TABLET | Refills: 0 | Status: SHIPPED | OUTPATIENT
Start: 2022-02-22 | End: 2022-03-01

## 2022-02-22 RX ORDER — GUAIFENESIN AND DEXTROMETHORPHAN HYDROBROMIDE 600; 30 MG/1; MG/1
1 TABLET, EXTENDED RELEASE ORAL 2 TIMES DAILY
Qty: 28 TABLET | Refills: 0 | Status: SHIPPED | OUTPATIENT
Start: 2022-02-22 | End: 2022-11-01

## 2022-02-22 RX ORDER — METHYLPREDNISOLONE 4 MG/1
TABLET ORAL
Qty: 1 TABLET | Refills: 0 | Status: SHIPPED | OUTPATIENT
Start: 2022-02-22 | End: 2022-02-28

## 2022-02-22 RX ORDER — ALBUTEROL SULFATE 90 UG/1
2 AEROSOL, METERED RESPIRATORY (INHALATION) EVERY 6 HOURS PRN
Qty: 18 G | Refills: 0 | Status: SHIPPED | OUTPATIENT
Start: 2022-02-22 | End: 2022-11-01

## 2022-02-22 ASSESSMENT — ENCOUNTER SYMPTOMS
SHORTNESS OF BREATH: 1
WHEEZING: 1
SORE THROAT: 0
SINUS PRESSURE: 0
RHINORRHEA: 0
ABDOMINAL PAIN: 0
COUGH: 1
NAUSEA: 0

## 2022-02-22 NOTE — PROGRESS NOTES
Subjective:      Patient ID: Ana Paula Link is a 55 y.o. female. Visit Information    Have you changed or started any medications since your last visit including any over-the-counter medicines, vitamins, or herbal medicines? no   Are you having any side effects from any of your medications? -  no  Have you stopped taking any of your medications? Is so, why? -  no    Have you seen any other physician or provider since your last visit? No  Have you had any other diagnostic tests since your last visit? No  Have you been seen in the emergency room and/or had an admission to a hospital since we last saw you? No  Have you had your routine dental cleaning in the past 6 months? no    Have you activated your [employer] account? If not, what are your barriers? Yes     Patient Care Team:  VENANCIO Panchal CNP as PCP - General (Certified Nurse Practitioner)  VENANCIO Panchal CNP as PCP - [facility]    Medical History Review  Past Medical, Family, and Social History reviewed and does not contribute to the patient presenting condition    Health Maintenance   Topic Date Due    Hepatitis C screen  Never done    HIV screen  Never done    DTaP/Tdap/Td vaccine (1 - Tdap) Never done    Colorectal Cancer Screen  Never done    Flu vaccine (1) Never done    COVID-19 Vaccine (3 - Booster for Scherer Peter series) 11/23/2021    Depression Screen  06/15/2022    A1C test (Diabetic or Prediabetic)  01/18/2023    Cervical cancer screen  06/15/2026    Lipid screen  01/18/2027    Hepatitis A vaccine  Aged Out    Hepatitis B vaccine  Aged Out    Hib vaccine  Aged Out    Meningococcal (ACWY) vaccine  Aged Out    Pneumococcal 0-64 years Vaccine  Aged Out     HPI  55year old female presents with persistent cough and intermittent sob for 2 weeks. Has had nasal congestion pressure cough 2 weeks ago and finished a course of amoxicillin without significant improvements.  States congestion goes to her chest and she has sob and wheezing with cough. Cough is productive with clear sputum. pulsox is 97%. Denies fever chills cp or nv abd pain. No known hx of pneumonia    Review of Systems   Constitutional: Negative for chills and fever. HENT: Negative for congestion, postnasal drip, rhinorrhea, sinus pressure and sore throat. Respiratory: Positive for cough, shortness of breath and wheezing. Cardiovascular: Negative for chest pain. Gastrointestinal: Negative for abdominal pain and nausea. Neurological: Negative for dizziness and headaches. Objective:   Physical Exam  Vitals and nursing note reviewed. Constitutional:       General: She is not in acute distress. Appearance: Normal appearance. HENT:      Nose: Nose normal.   Eyes:      Conjunctiva/sclera: Conjunctivae normal.   Cardiovascular:      Rate and Rhythm: Normal rate and regular rhythm. Heart sounds: Normal heart sounds. Pulmonary:      Effort: Pulmonary effort is normal. No respiratory distress. Breath sounds: Normal breath sounds. Abdominal:      Palpations: Abdomen is soft. Tenderness: There is no abdominal tenderness. Musculoskeletal:         General: Normal range of motion. Cervical back: Neck supple. Lymphadenopathy:      Cervical: No cervical adenopathy. Skin:     General: Skin is warm and dry. Neurological:      Mental Status: She is alert and oriented to person, place, and time. Cranial Nerves: No cranial nerve deficit. Psychiatric:         Mood and Affect: Mood normal.         Behavior: Behavior normal.         Assessment:      1. Acute bronchitis, unspecified organism    2. SOB (shortness of breath)    3.  Cough            Plan:      BP Readings from Last 3 Encounters:   02/22/22 110/74   01/20/22 106/60   07/21/21 98/60     /74   Pulse 82   Temp 97.3 °F (36.3 °C) (Infrared)   Ht 5' 3\" (1.6 m)   Wt 150 lb 12.8 oz (68.4 kg)   SpO2 97%   BMI 26.71 kg/m²   Lab Results   Component Value Date    WBC 9.1 11/02/2017 HGB 12.5 11/02/2017    HCT 38.1 11/02/2017     11/02/2017    CHOL 181 01/18/2022    TRIG 283 (H) 01/18/2022    HDL 35 (L) 01/18/2022    GLUF 96 01/18/2022    LABA1C 5.7 01/18/2022     No results found for: CALCIUM, PHOS  Lab Results   Component Value Date    LDLCHOLESTEROL 89 01/18/2022         1. Acute bronchitis, unspecified organism  - start medrol dosepack and rescue inhaler   - albuterol sulfate HFA (PROAIR HFA) 108 (90 Base) MCG/ACT inhaler; Inhale 2 puffs into the lungs every 6 hours as needed for Wheezing  Dispense: 18 g; Refill: 0  - methylPREDNISolone (MEDROL DOSEPACK) 4 MG tablet; Take by mouth. Dispense: 1 tablet; Refill: 0    2. SOB (shortness of breath)/ 3. Cough  - XR CHEST (2 VW); Future  - Dextromethorphan-guaiFENesin (MUCINEX DM)  MG TB12; Take 1 tablet by mouth 2 times daily  Dispense: 28 tablet; Refill: 0  - doxycycline hyclate (VIBRA-TABS) 100 MG tablet; Take 1 tablet by mouth 2 times daily for 7 days  Dispense: 14 tablet; Refill: 0  - enc to increase fluids and rest       Requested Prescriptions     Signed Prescriptions Disp Refills    albuterol sulfate HFA (PROAIR HFA) 108 (90 Base) MCG/ACT inhaler 18 g 0     Sig: Inhale 2 puffs into the lungs every 6 hours as needed for Wheezing    methylPREDNISolone (MEDROL DOSEPACK) 4 MG tablet 1 tablet 0     Sig: Take by mouth.  Dextromethorphan-guaiFENesin (MUCINEX DM)  MG TB12 28 tablet 0     Sig: Take 1 tablet by mouth 2 times daily    doxycycline hyclate (VIBRA-TABS) 100 MG tablet 14 tablet 0     Sig: Take 1 tablet by mouth 2 times daily for 7 days       Medications Discontinued During This Encounter   Medication Reason    Dextromethorphan-guaiFENesin (MUCINEX DM)  MG TB12 LIST CLEANUP       Discussed use, benefit, and side effects of prescribed medications. Barriers to medication compliance addressed. All patient questions answered. Pt voiced understanding. Return for as scheduled , HLD.

## 2022-07-22 ENCOUNTER — OFFICE VISIT (OUTPATIENT)
Dept: FAMILY MEDICINE CLINIC | Age: 47
End: 2022-07-22
Payer: COMMERCIAL

## 2022-07-22 VITALS
TEMPERATURE: 97.5 F | SYSTOLIC BLOOD PRESSURE: 118 MMHG | DIASTOLIC BLOOD PRESSURE: 78 MMHG | BODY MASS INDEX: 27.21 KG/M2 | WEIGHT: 153.6 LBS | HEART RATE: 68 BPM | RESPIRATION RATE: 14 BRPM

## 2022-07-22 DIAGNOSIS — N93.9 ABNORMAL UTERINE BLEEDING: ICD-10-CM

## 2022-07-22 DIAGNOSIS — N92.0 MENORRHAGIA WITH REGULAR CYCLE: ICD-10-CM

## 2022-07-22 DIAGNOSIS — E78.2 MIXED HYPERLIPIDEMIA: Primary | ICD-10-CM

## 2022-07-22 PROCEDURE — 99214 OFFICE O/P EST MOD 30 MIN: CPT | Performed by: NURSE PRACTITIONER

## 2022-07-22 RX ORDER — M-VIT,TX,IRON,MINS/CALC/FOLIC 27MG-0.4MG
1 TABLET ORAL DAILY
COMMUNITY

## 2022-07-22 SDOH — ECONOMIC STABILITY: FOOD INSECURITY: WITHIN THE PAST 12 MONTHS, YOU WORRIED THAT YOUR FOOD WOULD RUN OUT BEFORE YOU GOT MONEY TO BUY MORE.: NEVER TRUE

## 2022-07-22 SDOH — ECONOMIC STABILITY: FOOD INSECURITY: WITHIN THE PAST 12 MONTHS, THE FOOD YOU BOUGHT JUST DIDN'T LAST AND YOU DIDN'T HAVE MONEY TO GET MORE.: NEVER TRUE

## 2022-07-22 ASSESSMENT — PATIENT HEALTH QUESTIONNAIRE - PHQ9
SUM OF ALL RESPONSES TO PHQ QUESTIONS 1-9: 0
SUM OF ALL RESPONSES TO PHQ QUESTIONS 1-9: 0
SUM OF ALL RESPONSES TO PHQ9 QUESTIONS 1 & 2: 0
1. LITTLE INTEREST OR PLEASURE IN DOING THINGS: 0
SUM OF ALL RESPONSES TO PHQ QUESTIONS 1-9: 0
2. FEELING DOWN, DEPRESSED OR HOPELESS: 0
SUM OF ALL RESPONSES TO PHQ QUESTIONS 1-9: 0

## 2022-07-22 ASSESSMENT — SOCIAL DETERMINANTS OF HEALTH (SDOH): HOW HARD IS IT FOR YOU TO PAY FOR THE VERY BASICS LIKE FOOD, HOUSING, MEDICAL CARE, AND HEATING?: NOT HARD AT ALL

## 2022-07-22 NOTE — PROGRESS NOTES
Subjective:      Patient ID: J Luis Hill is a 55 y.o. female. Visit Information    Have you changed or started any medications since your last visit including any over-the-counter medicines, vitamins, or herbal medicines? yes - see med list   Are you having any side effects from any of your medications? -  no  Have you stopped taking any of your medications? Is so, why? -  yes - see med list    Have you seen any other physician or provider since your last visit? No  Have you had any other diagnostic tests since your last visit? No  Have you been seen in the emergency room and/or had an admission to a hospital since we last saw you? No  Have you had your routine dental cleaning in the past 6 months? yes     Have you activated your "[employer] account? If not, what are your barriers? Yes     Patient Care Team:  VENANCIO Fuchs CNP as PCP - General (Certified Nurse Practitioner)  VENANCIO [facility]uchs CNP as PCP [facility]    Medical History Review  Past Medical, Family, and Social History reviewed and does contribute to the patient presenting condition    Health Maintenance   Topic Date Due    HIV screen  Never done    Hepatitis C screen  Never done    DTaP/Tdap/Td vaccine (1 - Tdap) Never done    Colorectal Cancer Screen  Never done    COVID-19 Vaccine (3 - Booster for Scherer Peter series) 11/23/2021    Flu vaccine (1) 09/01/2022    A1C test (Diabetic or Prediabetic)  01/18/2023    Depression Screen  07/22/2023    Cervical cancer screen  06/15/2026    Lipids  01/18/2027    Hepatitis A vaccine  Aged Out    Hepatitis B vaccine  Aged Out    Hib vaccine  Aged Out    Meningococcal (ACWY) vaccine  Aged Out    Pneumococcal 0-64 years Vaccine  Aged Out       HPI    55year old female presents with management of HLD and abnormal uterine bleeding. Noted to have HLD and has been on diet measures. Currently is not on statn therapy. States she watches diet and stays active.    Also c/o menorrhagia and states her menstrual cycle is very heavy this month lasting for 7 days. States it is usually heavy for a couple of days and lasts for over 7 days this month. Denies nausea vomiting dysuria abnormal vaginal discharge or abd pain. Hx of overweight     Review of Systems   Constitutional:  Negative for chills and fever. Respiratory:  Negative for shortness of breath and wheezing. Cardiovascular:  Negative for chest pain and leg swelling. Gastrointestinal:  Negative for abdominal pain, nausea and vomiting. Genitourinary:  Positive for menstrual problem. Negative for dysuria, urgency and vaginal discharge. Neurological:  Negative for dizziness, weakness and numbness. Psychiatric/Behavioral:  Negative for agitation and behavioral problems. Objective:   Physical Exam  Vitals and nursing note reviewed. Constitutional:       General: She is not in acute distress. Appearance: Normal appearance. HENT:      Nose: Nose normal.   Eyes:      Conjunctiva/sclera: Conjunctivae normal.   Cardiovascular:      Rate and Rhythm: Normal rate and regular rhythm. Heart sounds: Normal heart sounds. Pulmonary:      Effort: Pulmonary effort is normal. No respiratory distress. Breath sounds: Normal breath sounds. Abdominal:      Palpations: Abdomen is soft. Tenderness: There is no abdominal tenderness. Musculoskeletal:         General: Normal range of motion. Cervical back: Neck supple. Lymphadenopathy:      Cervical: No cervical adenopathy. Skin:     General: Skin is warm and dry. Neurological:      Mental Status: She is alert and oriented to person, place, and time. Cranial Nerves: No cranial nerve deficit. Psychiatric:         Mood and Affect: Mood normal.         Behavior: Behavior normal.       Assessment:     1. Mixed hyperlipidemia    2. Menorrhagia with regular cycle    3.  Abnormal uterine bleeding            Plan:       BP Readings from Last 3 Encounters:   07/22/22 118/78   02/22/22 110/74 01/20/22 106/60     /78 (Site: Left Upper Arm, Position: Sitting, Cuff Size: Medium Adult)   Pulse 68   Temp 97.5 °F (36.4 °C) (Oral)   Resp 14   Wt 153 lb 9.6 oz (69.7 kg)   BMI 27.21 kg/m²   Lab Results   Component Value Date    WBC 9.1 11/02/2017    HGB 12.5 11/02/2017    HCT 38.1 11/02/2017     11/02/2017    CHOL 181 01/18/2022    TRIG 283 (H) 01/18/2022    HDL 35 (L) 01/18/2022    GLUF 96 01/18/2022    LABA1C 5.7 01/18/2022     No results found for: CALCIUM, PHOS  Lab Results   Component Value Date    LDLCHOLESTEROL 89 01/18/2022         1. Mixed hyperlipidemia  - cont diet measures and consider statin therapy pending on test results   - Lipid Panel; Future  - iscussion with pt/parent about his current diet and exercise habits, and personalized advice was provided regarding recommended lifestyle changes, diet and exercise. Provider spent 10 minutes counseling patient. 2. Menorrhagia with regular cycle/ 3. Abnormal uterine bleeding  - CBC; Future  - TSH With Reflex Ft4; Future  - US NON OB TRANSVAGINAL; Future  - advised to follow up with ob/gyn as scheduled       Requested Prescriptions      No prescriptions requested or ordered in this encounter       There are no discontinued medications. All patient questions answered. Pt voiced understanding. Return in about 3 months (around 10/29/2022) for HLD AUB .             Adam Flores, APRN - CNP

## 2022-07-24 ASSESSMENT — ENCOUNTER SYMPTOMS
NAUSEA: 0
SHORTNESS OF BREATH: 0
VOMITING: 0
ABDOMINAL PAIN: 0
WHEEZING: 0

## 2022-08-30 ENCOUNTER — TELEPHONE (OUTPATIENT)
Dept: FAMILY MEDICINE CLINIC | Age: 47
End: 2022-08-30

## 2022-08-30 NOTE — TELEPHONE ENCOUNTER
Patient called and states that she can not get her ultrasound at [facility] until October. I advised her to call back and see if she would be able to get it done any earlier and or done at another [facility] facility. She wanted us to get it done for her right now, and you to change it. UPDATE: PT HAS APPT TOMORROW FOR ULTRASOUND AT [address] AT 9:30 AM.    I RE-CHECKED THE APPT SCHEDULE, AND NOTICED IT WAS CHANGED TO EARLIER APPT.     MARYLIN

## 2022-08-31 ENCOUNTER — TELEPHONE (OUTPATIENT)
Dept: OBGYN CLINIC | Age: 47
End: 2022-08-31

## 2022-08-31 ENCOUNTER — HOSPITAL ENCOUNTER (OUTPATIENT)
Dept: ULTRASOUND IMAGING | Age: 47
Discharge: HOME | End: 2022-09-02
Payer: COMMERCIAL

## 2022-08-31 DIAGNOSIS — N93.9 ABNORMAL UTERINE BLEEDING (AUB): Primary | ICD-10-CM

## 2022-08-31 DIAGNOSIS — N93.9 ABNORMAL UTERINE BLEEDING: ICD-10-CM

## 2022-08-31 PROCEDURE — 76830 TRANSVAGINAL US NON-OB: CPT

## 2022-08-31 NOTE — TELEPHONE ENCOUNTER
----- Message from VENANCIO Barton CNP sent at 8/31/2022 11:36 AM EDT -----  Normal endometrial stripe. Nabothian cyst in cervix. Bilateral ovarian follicles noted. Fibroid at uterine fundus. Patient having concerns of AUB. Please order TSH, CBC, Quant HCG, PT, PTT, INR, estradiol, FSH. To complete prior to follow up. Please schedule follow up with physician to discuss concerns.

## 2022-08-31 NOTE — TELEPHONE ENCOUNTER
Patient has not been seen in our office in over a year, after speaking with Antony Sahu she recommends patient come in for annual exam and we can order additional lab work at that time, and then patient can follow up with the physician for a work up for irregular vaginal bleeding.  Call transferred to [facility] to schedule an annual exam.

## 2022-09-01 ENCOUNTER — OFFICE VISIT (OUTPATIENT)
Dept: OBGYN CLINIC | Age: 47
End: 2022-09-01
Payer: COMMERCIAL

## 2022-09-01 VITALS
HEIGHT: 63 IN | SYSTOLIC BLOOD PRESSURE: 124 MMHG | WEIGHT: 153 LBS | DIASTOLIC BLOOD PRESSURE: 76 MMHG | BODY MASS INDEX: 27.11 KG/M2

## 2022-09-01 DIAGNOSIS — N92.6 IRREGULAR MENSES: ICD-10-CM

## 2022-09-01 DIAGNOSIS — Z11.51 SPECIAL SCREENING EXAMINATION FOR HUMAN PAPILLOMAVIRUS (HPV): ICD-10-CM

## 2022-09-01 DIAGNOSIS — Z01.419 WELL FEMALE EXAM WITH ROUTINE GYNECOLOGICAL EXAM: Primary | ICD-10-CM

## 2022-09-01 PROCEDURE — 99396 PREV VISIT EST AGE 40-64: CPT | Performed by: NURSE PRACTITIONER

## 2022-09-01 NOTE — PROGRESS NOTES
History and Physical  [address], [address] [address], [address] [facility] [phone]   Fax [phone]  Hussein Moore  2022              55 y.o. Chief Complaint   Patient presents with    Annual Exam       Patient's last menstrual period was 2022 (exact date). Primary Care Physician: VENANCIO Salter CNP    The patient was seen and examined. She is here for her annual exam. Patient  saw PCP for irregular menses and had u/s ordered. U/s showed a fibroid, nabothian cyst and ovarian follicles. PCP ordered labs and referred here. Patient states 4 weeks ago she had a heavy menses that lasted for 1 week. Had to change her pad every 2 hour. States a menses 2 weeks later and is still on that menses now. Her bowels are regular. There are no voiding complaints. She denies any bloating. She denies vaginal discharge and was counseled on STD's and the need for barrier contraception. HPI : Hussein Moore is a 55 y.o. female     Annual exam  Irregular menses  U/s showed a fibroid and nabothian cyst     no Bloating  no Early Satiety  no Unexplained weight change of more than 15 lbs  no  PMB  no  PCB  ________________________________________________________________________  OB History    Para Term  AB Living   1 1 1 0 0 1   SAB IAB Ectopic Molar Multiple Live Births   0 0 0 0 0 1      # Outcome Date GA Lbr Gerard/2nd Weight Sex Delivery Anes PTL Lv   1 Term 03 38w0d 04:00 6 lb (2.722 kg) F CS-LTranv  N NOEMY      Birth Comments: Breech     History reviewed. No pertinent past medical history.                                                                 Past Surgical History:   Procedure Laterality Date     SECTION       Family History   Problem Relation Age of Onset    Heart Attack Father     Diabetes Mother     Hypertension Mother      Social History     Socioeconomic History    Marital status:      Spouse name: Not on file    Number of children: Not on file    Years of education: Not on file    Highest education level: Not on file   Occupational History    Not on file   Tobacco Use    Smoking status: Never    Smokeless tobacco: Never   Substance and Sexual Activity    Alcohol use: Yes     Alcohol/week: 1.0 standard drink     Types: 1 Standard drinks or equivalent per week    Drug use: No    Sexual activity: Yes     Partners: Male   Other Topics Concern    Not on file   Social History Narrative    Not on file     Social Determinants of Health     Financial Resource Strain: Low Risk     Difficulty of Paying Living Expenses: Not hard at all   Food Insecurity: No Food Insecurity    Worried About Running Out of Food in the Last Year: Never true    Ran Out of Food in the Last Year: Never true   Transportation Needs: Not on file   Physical Activity: Not on file   Stress: Not on file   Social Connections: Not on file   Intimate Partner Violence: Not on file   Housing Stability: Not on file       MEDICATIONS:  Current Outpatient Medications   Medication Sig Dispense Refill    Cyanocobalamin (VITAMIN B 12 PO) Take 1 capsule by mouth daily      Multiple Vitamins-Minerals (THERAPEUTIC MULTIVITAMIN-MINERALS) tablet Take 1 tablet by mouth in the morning. albuterol sulfate HFA (PROAIR HFA) 108 (90 Base) MCG/ACT inhaler Inhale 2 puffs into the lungs every 6 hours as needed for Wheezing (Patient not taking: No sig reported) 18 g 0    Dextromethorphan-guaiFENesin (MUCINEX DM)  MG TB12 Take 1 tablet by mouth 2 times daily (Patient not taking: No sig reported) 28 tablet 0    diclofenac sodium (VOLTAREN) 1 % GEL Apply 2 g topically 3 times daily as needed for Pain (Patient not taking: No sig reported) 100 g 1     No current facility-administered medications for this visit.            ALLERGIES:  Allergies as of 09/01/2022    (No Known Allergies)       Symptoms of decreased mood absent  Symptoms of anhedonia absent    **If either question is answered in a  positive fashion then complete the PHQ9 Scoring Evaluation and make the appropriate referral**      Immunization status: stated as current, but no records available. Gynecologic History:  Menarche: 14 yo  Menopause at [address] [place] yo     Patient's last menstrual period was 08/18/2022 (exact date). Sexually Active: Yes    STD History: No     Permanent Sterilization: No   Reversible Birth Control: No        Hormone Replacement Exposure: No      Genetic Qualified Family History of Breast, Ovarian , Colon or Uterine Cancer: No     If YES see scanned worksheet. Preventative Health Testing:    Health Maintenance:  Health Maintenance Due   Topic Date Due    HIV screen  Never done    Hepatitis C screen  Never done    DTaP/Tdap/Td vaccine (1 - Tdap) Never done    Colorectal Cancer Screen  Never done    COVID-19 Vaccine (3 - Booster for Pfizer series) 11/23/2021    Flu vaccine (1) Never done       Date of Last Pap Smear: 6/15/2021 neg/neg  Abnormal Pap Smear History: denies  Colposcopy History:   Date of Last Mammogram: 2/2022 neg  Date of Last Colonoscopy:   Date of Last Bone Density:      ________________________________________________________________________        REVIEW OF SYSTEMS:    see problem list   A minimum of an eleven point review of systems was completed. Review Of Systems (11 point):  Constitutional: No fever, chills or malaise;  No weight change or fatigue  Head and Eyes: No vision changes, Headache, Dizziness or trauma in last 12 months  ENT ROS: No hearing, Tinnitis, sinus or taste problems  Hematological and Lymphatic ROS:No Lymphoma, Von Willebrand's, Hemophillia or Bleeding History  Psych ROS: No Depression, Homicidal thoughts,suicidal thoughts, or anxiety  Breast ROS: No breast abnormalities or lumps  Respiratory ROS: No SOB, Pneumoniae,Cough, or Pulmonary Embolism   Cardiovascular ROS: No Chest Pain with Exertion, Palpitations, Syncope, Edema, Arrhythmia  Gastrointestinal ROS: No Indigestion, Heartburn, Nausea, vomiting, Diarrhea, Constipation,or Bowel Changes; No Bloody Stools or melena  Genito-Urinary ROS: No Dysuria, Hematuria or Nocturia. No Urinary Incontinence or Vaginal Discharge  Musculoskeletal ROS: No Arthralgia, Arthritis,Gout,Osteoporosis or Rheumatism  Neurological ROS: No CVA, Migraines, Epilepsy, Seizure Hx, or Limb Weakness  Dermatological ROS: No Rash, Itching, Hives, Mole Changes or Cancer                                                                                                                                                                                                                                  PHYSICAL Exam:     Constitutional:  Vitals:    09/01/22 1549   BP: 124/76   Site: Left Upper Arm   Position: Sitting   Cuff Size: Medium Adult   Weight: 153 lb (69.4 kg)   Height: 5' 3\" (1.6 m)       Chaperone for Intimate Exam  Chaperone was offered and accepted as part of the rooming process. Chaperone: Monet SOLORZANO          General Appearance: This  is a well Developed, well Nourished, well groomed female. Her BMI was reviewed. Nutritional decision making was discussed. Skin:  There was a Normal Inspection of the skin without rashes or lesions. There were no rashes. (Papular, Maculopapular, Hives, Pustular, Macular)     There were no lesions (Ulcers, Erythema, Abn. Appearing Nevi)            Lymphatic:  No Lymph Nodes were Palpable in the neck , axilla or groin.  0 # Of Lymph Nodes; Location ; Character [Normal]  [Shotty] [Tender] [Enlarged]     Neck and EENT:  The neck was supple. There were no masses   The thyroid was not enlarged and had no masses. Perrla, EOMI B/L, TMI B/L No Abnormalities. Throat inspected-No exudates or Masses, Nares Patent No Masses        Respiratory: The lungs were auscultated and found to be clear. There were no rales, rhonchi or wheezes. There was a good respiratory effort. Cardiovascular:   The heart was in a regular rate and rhythm. . No S3 or S4. There was no murmur appreciated. Location, grade, and radiation are not applicable. Extremities: The patients extremities were without calf tenderness, edema, or varicosities. There was full range of motion in all four extremities. Pulses in all four extremities were appreciated and are 2/4. Abdomen: The abdomen was soft and non-tender. There were good bowel sounds in all quadrants and there was no guarding, rebound or rigidity. On evaluation there was no evidence of hepatosplenomegaly and there was no costal vertebral sherley tenderness bilaterally. No hernias were appreciated. Abdominal Scars: none    Psych: The patient had a normal Orientation to: Time, Place, Person, and Situation  There is no Mood / Affect changes    Breast:  (Chest)  normal appearance, no masses or tenderness, Inspection negative, No nipple retraction or dimpling, No nipple discharge or bleeding, No axillary or supraclavicular adenopathy, Normal to palpation without dominant masses  Self breast exams were reviewed in detail. Literature was given. Pelvic Exam:  External genitalia: normal general appearance  Urinary system: urethral meatus normal  Vaginal: normal mucosa without prolapse or lesions  Cervix: normal appearance and nabothian cyst(s)  Adnexa: normal bimanual exam  Uterus: normal single, nontender    Rectal Exam:  exam declined by patient          Musculosk:  Normal Gait and station was noted. Digits were evaluated without abnormal findings. Range of motion, stability and strength were evaluated and found to be appropriate for the patients age. ASSESSMENT:      55 y.o. Annual   Diagnosis Orders   1. Well female exam with routine gynecological exam  PAP SMEAR      2. Special screening examination for human papillomavirus (HPV)  PAP SMEAR      3. Irregular menses               Chief Complaint   Patient presents with    Annual Exam          History reviewed.  No pertinent past Thin Prep     Order Specific Question:   Prior Abnormal Pap Test     Answer:   No     Order Specific Question:   Screening or Diagnostic     Answer:   Screening     Order Specific Question:   HPV Requested? Answer:   Yes     Order Specific Question:   High Risk Patient     Answer:   N/A           The patient, Gio Jimenez is a 55 y.o. female, was seen with a total time spent of 30 minutes for the visit on this date of service by the E/M provider. The time component had both face to face and non face to face time spent in determining the total time component. Counseling and education regarding her diagnosis listed below and her options regarding those diagnoses were also included in determining her time component. Diagnosis Orders   1. Well female exam with routine gynecological exam  PAP SMEAR      2. Special screening examination for human papillomavirus (HPV)  PAP SMEAR      3. Irregular menses             The patient had her preventative health maintenance recommendations and follow-up reviewed with her at the completion of her visit.

## 2022-09-26 ENCOUNTER — HOSPITAL ENCOUNTER (OUTPATIENT)
Age: 47
Discharge: HOME | End: 2022-09-26
Payer: COMMERCIAL

## 2022-09-26 DIAGNOSIS — N93.9 ABNORMAL UTERINE BLEEDING (AUB): ICD-10-CM

## 2022-09-26 LAB
ESTRADIOL LEVEL: 111.5 PG/ML (ref 27–314)
FOLLICLE STIMULATING HORMONE: 7.9 MIU/ML (ref 1.7–21.5)
HCG QUANTITATIVE: <1 MIU/ML
HCT VFR BLD CALC: 35.2 % (ref 36–46)
HEMOGLOBIN: 11.2 G/DL (ref 12–16)
INR BLD: 1
MCH RBC QN AUTO: 22.5 PG (ref 26–34)
MCHC RBC AUTO-ENTMCNC: 31.8 G/DL (ref 31–37)
MCV RBC AUTO: 70.8 FL (ref 80–100)
PARTIAL THROMBOPLASTIN TIME: 28.6 SEC (ref 24–36)
PDW BLD-RTO: 14.6 % (ref 11.5–14.9)
PLATELET # BLD: 434 K/UL (ref 150–450)
PMV BLD AUTO: 7.2 FL (ref 6–12)
PROTHROMBIN TIME: 12.8 SEC (ref 11.8–14.6)
RBC # BLD: 4.97 M/UL (ref 4–5.2)
TSH SERPL DL<=0.05 MIU/L-ACNC: 1.14 UIU/ML (ref 0.3–5)
WBC # BLD: 6.6 K/UL (ref 3.5–11)

## 2022-09-26 PROCEDURE — 36415 COLL VENOUS BLD VENIPUNCTURE: CPT

## 2022-09-26 PROCEDURE — 85610 PROTHROMBIN TIME: CPT

## 2022-09-26 PROCEDURE — 83001 ASSAY OF GONADOTROPIN (FSH): CPT

## 2022-09-26 PROCEDURE — 82670 ASSAY OF TOTAL ESTRADIOL: CPT

## 2022-09-26 PROCEDURE — 85027 COMPLETE CBC AUTOMATED: CPT

## 2022-09-26 PROCEDURE — 84443 ASSAY THYROID STIM HORMONE: CPT

## 2022-09-26 PROCEDURE — 84702 CHORIONIC GONADOTROPIN TEST: CPT

## 2022-09-26 PROCEDURE — 85730 THROMBOPLASTIN TIME PARTIAL: CPT

## 2022-11-01 ENCOUNTER — OFFICE VISIT (OUTPATIENT)
Dept: OBGYN CLINIC | Age: 47
End: 2022-11-01
Payer: COMMERCIAL

## 2022-11-01 VITALS
SYSTOLIC BLOOD PRESSURE: 122 MMHG | BODY MASS INDEX: 27.46 KG/M2 | DIASTOLIC BLOOD PRESSURE: 70 MMHG | HEIGHT: 63 IN | WEIGHT: 155 LBS

## 2022-11-01 DIAGNOSIS — N92.6 IRREGULAR MENSES: Primary | ICD-10-CM

## 2022-11-01 DIAGNOSIS — D25.9 UTERINE LEIOMYOMA, UNSPECIFIED LOCATION: ICD-10-CM

## 2022-11-01 PROCEDURE — 99213 OFFICE O/P EST LOW 20 MIN: CPT | Performed by: OBSTETRICS & GYNECOLOGY

## 2022-11-01 NOTE — PROGRESS NOTES
Insecurity: No Food Insecurity    Worried About Running Out of Food in the Last Year: Never true    Ran Out of Food in the Last Year: Never true   Transportation Needs: Not on file   Physical Activity: Not on file   Stress: Not on file   Social Connections: Not on file   Intimate Partner Violence: Not on file   Housing Stability: Not on file         MEDICATIONS:  Current Outpatient Medications   Medication Sig Dispense Refill    Cyanocobalamin (VITAMIN B 12 PO) Take 1 capsule by mouth daily      Multiple Vitamins-Minerals (THERAPEUTIC MULTIVITAMIN-MINERALS) tablet Take 1 tablet by mouth in the morning. No current facility-administered medications for this visit. ALLERGIES:  Allergies as of 11/01/2022    (No Known Allergies)         REVIEW OF SYSTEMS:       A minimum of an eleven point review of systems was completed. Review Of Systems (11 point):  Constitutional: No fever, chills or malaise; No weight change or fatigue  Head and Eyes: No vision, Headache, Dizziness or trauma in last 12 months  ENT ROS: No hearing, Tinnitis, sinus or taste problems  Hematological and Lymphatic ROS:No Lymphoma, Von Willebrand's, Hemophillia or Bleeding History  Psych ROS: No Depression, Homicidal thoughts,suicidal thoughts, or anxiety  Breast ROS: No prior breast abnormalities or lumps  Respiratory ROS: No SOB, Pneumoniae,Cough, or Pulmonary Embolism History  Cardiovascular ROS: No Chest Pain with Exertion, Palpitations, Syncope, Edema, Arrhythmia  Gastrointestinal ROS: No Indigestion, Heartburn, Nausea, vomiting, Diarrhea, Constipation,or Bowel Changes; No Bloody Stools or melena  Genito-Urinary ROS: No Dysuria, Hematuria or Nocturia.  No Urinary Incontinence or Vaginal Discharge  Musculoskeletal ROS: No Arthralgia, Arthritis,Gout,Osteoporosis or Rheumatism  Neurological ROS: No CVA, Migraines, Epilepsy, Seizure Hx, or Limb Weakness  Dermatological ROS: No Rash, Itching, Hives, Mole Changes or Cancer          Blood pressure 122/70, height 5' 3\" (1.6 m), weight 155 lb (70.3 kg), last menstrual period 10/24/2022, not currently breastfeeding. Abdomen: Soft non-tender; good bowel sounds. No guarding, rebound or rigidity. No CVA tenderness bilaterally. Extremities: No calf tenderness, DTR 2/4, and No edema bilaterally    Pelvic: Exam deferred. Diagnostics:  EXAMINATION:   PELVIC ULTRASOUND       8/31/2022       TECHNIQUE:   Transvaginal pelvic ultrasound was performed. No transabdominal order was provided. COMPARISON:   10/19/2017       HISTORY:   ORDERING SYSTEM PROVIDED HISTORY: Abnormal uterine bleeding   TECHNOLOGIST PROVIDED HISTORY:   This procedure can be scheduled via [facility]. Access your [facility] account by   visiting [email or web address].   abnormal uterine bleeding       19-year-old female with abnormal uterine bleeding       FINDINGS:       Measurements:       Uterus: 12.9 x 4.9 x 6.2 cm. Endometrial stripe: 6 mm. Right Ovary:2.2 x 1.6 x 1.8 cm. Left Ovary: 1.6 x 1.0 x 1.8 cm. Ultrasound Findings:       Patient's LMP was 1 week ago. Uterus: Heterogeneous echogenicity of the uterus. Fibroid at the uterine   fundus measuring 3.9 x 4.2 x 3.5 cm. Endometrial stripe: Endometrial stripe is within normal limits. Large nabothian cyst in the cervix. Right Ovary: Right ovary is within normal limits. Left Ovary:  Left ovary is within normal limits. Bilateral ovarian follicles. Color flow projects over the bilateral ovarian   parenchyma. Free Fluid: No evidence of free fluid. Impression   1. Large nabothian cyst in the cervix. 2. Endometrial stripe thickness within normal limits measuring 6 mm. 3. Uterine fundal fibroid measuring 4.2 cm. 4. Bilateral ovarian follicles. Color flow projects over the bilateral   ovarian parenchyma.            Lab Results:  Results for orders placed or performed during the hospital encounter of 09/26/22   TSH With Reflex Ft4   Result Value Ref Range    TSH 1.14 0.30 - 5.00 uIU/mL   CBC   Result Value Ref Range    WBC 6.6 3.5 - 11.0 k/uL    RBC 4.97 4.0 - 5.2 m/uL    Hemoglobin 11.2 (L) 12.0 - 16.0 g/dL    Hematocrit 35.2 (L) 36 - 46 %    MCV 70.8 (L) 80 - 100 fL    MCH 22.5 (L) 26 - 34 pg    MCHC 31.8 31 - 37 g/dL    RDW 14.6 11.5 - 14.9 %    Platelets 419 843 - 608 k/uL    MPV 7.2 6.0 - 12.0 fL   APTT   Result Value Ref Range    PTT 28.6 24.0 - 36.0 sec   Protime-INR   Result Value Ref Range    Protime 12.8 11.8 - 14.6 sec    INR 1.0    Estradiol   Result Value Ref Range    Estradiol 111.5 27 - 201 pg/mL   Follicle Stimulating Hormone   Result Value Ref Range    FSH 7.9 1.7 - 21.5 mIU/mL   hCG, Quantitative, Pregnancy   Result Value Ref Range    hCG Quant <1 <5 mIU/mL         Assessment:   Diagnosis Orders   1. Irregular menses        2. Uterine leiomyoma, unspecified location          Patient Active Problem List    Diagnosis Date Noted    Fibroid 10/19/2017           PLAN:  Return in about 4 weeks (around 11/29/2022) for endometrial biopsy/ office hysteroscopy. If biopsy negative consider cycling with oral TXA vs progesterone vs IUD  Repeat Annual every 1 year  Cervical Cytology Evaluation begins at 24years old. If Negative Cytology, Follow-up screening per current guidelines. Return to the office in 3-4 weeks. Counseled on preventative health maintenance follow-up. The patient, Nicolas Del Toro is a 55 y.o. female, was seen with a total time spent of 20 minutes for the visit on this date of service by the E/M provider. The time component had both face to face and non face to face time spent in determining the total time component. Counseling and education regarding her diagnosis listed below and her options regarding those diagnoses were also included in determining her time component. Diagnosis Orders   1. Irregular menses        2.  Uterine leiomyoma, unspecified location             The patient had her preventative health maintenance recommendations and follow-up reviewed with her at the completion of her visit.

## 2023-06-22 ENCOUNTER — TELEPHONE (OUTPATIENT)
Dept: OBGYN CLINIC | Age: 48
End: 2023-06-22

## 2023-07-24 ENCOUNTER — TELEPHONE (OUTPATIENT)
Dept: OBGYN CLINIC | Age: 48
End: 2023-07-24

## 2023-07-24 NOTE — TELEPHONE ENCOUNTER
Pt cancelled her EMB procedure appt several times, I did leave a message again for pt to call and reschedule if this is what she wishes to do.

## 2024-09-19 ENCOUNTER — OFFICE VISIT (OUTPATIENT)
Dept: FAMILY MEDICINE CLINIC | Age: 49
End: 2024-09-19
Payer: COMMERCIAL

## 2024-09-19 ENCOUNTER — HOSPITAL ENCOUNTER (OUTPATIENT)
Age: 49
Setting detail: SPECIMEN
Discharge: HOME | End: 2024-09-19

## 2024-09-19 VITALS
SYSTOLIC BLOOD PRESSURE: 124 MMHG | BODY MASS INDEX: 28 KG/M2 | RESPIRATION RATE: 18 BRPM | HEART RATE: 60 BPM | WEIGHT: 158 LBS | OXYGEN SATURATION: 98 % | HEIGHT: 63 IN | DIASTOLIC BLOOD PRESSURE: 78 MMHG

## 2024-09-19 DIAGNOSIS — Z12.11 SCREENING FOR COLON CANCER: ICD-10-CM

## 2024-09-19 DIAGNOSIS — Z00.00 ENCOUNTER FOR WELL ADULT EXAM WITHOUT ABNORMAL FINDINGS: Primary | ICD-10-CM

## 2024-09-19 DIAGNOSIS — Z00.00 ENCOUNTER FOR WELL ADULT EXAM WITHOUT ABNORMAL FINDINGS: ICD-10-CM

## 2024-09-19 DIAGNOSIS — J06.9 VIRAL URI: ICD-10-CM

## 2024-09-19 DIAGNOSIS — Z12.31 SCREENING MAMMOGRAM FOR BREAST CANCER: ICD-10-CM

## 2024-09-19 LAB
ALBUMIN SERPL-MCNC: 4.9 G/DL (ref 3.5–5.2)
ALBUMIN/GLOB SERPL: 2 {RATIO} (ref 1–2.5)
ALP SERPL-CCNC: 55 U/L (ref 35–104)
ALT SERPL-CCNC: 34 U/L (ref 10–35)
ANION GAP SERPL CALCULATED.3IONS-SCNC: 12 MMOL/L (ref 9–16)
AST SERPL-CCNC: 22 U/L (ref 10–35)
BILIRUB SERPL-MCNC: 0.4 MG/DL (ref 0–1.2)
BUN SERPL-MCNC: 10 MG/DL (ref 6–20)
CALCIUM SERPL-MCNC: 9.4 MG/DL (ref 8.6–10.4)
CHLORIDE SERPL-SCNC: 100 MMOL/L (ref 98–107)
CHOLEST SERPL-MCNC: 192 MG/DL (ref 0–199)
CHOLESTEROL/HDL RATIO: 6
CO2 SERPL-SCNC: 26 MMOL/L (ref 20–31)
CREAT SERPL-MCNC: 0.5 MG/DL (ref 0.5–0.9)
EST. AVERAGE GLUCOSE BLD GHB EST-MCNC: 120 MG/DL
GFR, ESTIMATED: >90 ML/MIN/1.73M2
GLUCOSE SERPL-MCNC: 80 MG/DL (ref 74–99)
HBA1C MFR BLD: 5.8 % (ref 4–6)
HDLC SERPL-MCNC: 33 MG/DL
LDLC SERPL CALC-MCNC: 106 MG/DL (ref 0–100)
POTASSIUM SERPL-SCNC: 4.2 MMOL/L (ref 3.7–5.3)
PROT SERPL-MCNC: 8 G/DL (ref 6.6–8.7)
SODIUM SERPL-SCNC: 138 MMOL/L (ref 136–145)
TRIGL SERPL-MCNC: 267 MG/DL (ref 0–149)
VLDLC SERPL CALC-MCNC: 53 MG/DL

## 2024-09-19 PROCEDURE — 99396 PREV VISIT EST AGE 40-64: CPT | Performed by: NURSE PRACTITIONER

## 2024-09-19 RX ORDER — AMOXICILLIN 875 MG
875 TABLET ORAL 2 TIMES DAILY
Qty: 20 TABLET | Refills: 0 | Status: SHIPPED | OUTPATIENT
Start: 2024-09-19 | End: 2024-09-29

## 2024-09-19 RX ORDER — GUAIFENESIN AND DEXTROMETHORPHAN HYDROBROMIDE 600; 30 MG/1; MG/1
1 TABLET, EXTENDED RELEASE ORAL 2 TIMES DAILY
Qty: 28 TABLET | Refills: 0 | Status: SHIPPED | OUTPATIENT
Start: 2024-09-19

## 2024-09-19 SDOH — ECONOMIC STABILITY: FOOD INSECURITY: WITHIN THE PAST 12 MONTHS, THE FOOD YOU BOUGHT JUST DIDN'T LAST AND YOU DIDN'T HAVE MONEY TO GET MORE.: NEVER TRUE

## 2024-09-19 SDOH — ECONOMIC STABILITY: INCOME INSECURITY: HOW HARD IS IT FOR YOU TO PAY FOR THE VERY BASICS LIKE FOOD, HOUSING, MEDICAL CARE, AND HEATING?: NOT VERY HARD

## 2024-09-19 SDOH — ECONOMIC STABILITY: FOOD INSECURITY: WITHIN THE PAST 12 MONTHS, YOU WORRIED THAT YOUR FOOD WOULD RUN OUT BEFORE YOU GOT MONEY TO BUY MORE.: NEVER TRUE

## 2024-09-19 ASSESSMENT — PATIENT HEALTH QUESTIONNAIRE - PHQ9
SUM OF ALL RESPONSES TO PHQ QUESTIONS 1-9: 0
2. FEELING DOWN, DEPRESSED OR HOPELESS: NOT AT ALL
SUM OF ALL RESPONSES TO PHQ9 QUESTIONS 1 & 2: 0
1. LITTLE INTEREST OR PLEASURE IN DOING THINGS: NOT AT ALL

## 2024-09-19 ASSESSMENT — ENCOUNTER SYMPTOMS
ABDOMINAL PAIN: 0
SHORTNESS OF BREATH: 0
COUGH: 1
SINUS PRESSURE: 1
RHINORRHEA: 1
BLOOD IN STOOL: 0
NAUSEA: 0
SORE THROAT: 0

## 2024-09-20 ENCOUNTER — TELEPHONE (OUTPATIENT)
Dept: FAMILY MEDICINE CLINIC | Age: 49
End: 2024-09-20

## 2024-09-20 RX ORDER — ROSUVASTATIN CALCIUM 10 MG/1
10 TABLET, COATED ORAL DAILY
Qty: 90 TABLET | Refills: 1 | Status: SHIPPED | OUTPATIENT
Start: 2024-09-20